# Patient Record
Sex: MALE | Race: WHITE | NOT HISPANIC OR LATINO | Employment: FULL TIME | RURAL
[De-identification: names, ages, dates, MRNs, and addresses within clinical notes are randomized per-mention and may not be internally consistent; named-entity substitution may affect disease eponyms.]

---

## 2021-10-20 ENCOUNTER — HOSPITAL ENCOUNTER (OUTPATIENT)
Dept: RADIOLOGY | Facility: HOSPITAL | Age: 51
Discharge: HOME | End: 2021-10-20
Attending: ORTHOPAEDIC SURGERY
Payer: OTHER GOVERNMENT

## 2021-10-20 DIAGNOSIS — M25.562 ACUTE PAIN OF LEFT KNEE: ICD-10-CM

## 2021-11-01 ENCOUNTER — HOSPITAL ENCOUNTER (OUTPATIENT)
Dept: RADIOLOGY | Facility: HOSPITAL | Age: 51
Discharge: HOME | End: 2021-11-01
Attending: ORTHOPAEDIC SURGERY
Payer: OTHER GOVERNMENT

## 2021-11-01 DIAGNOSIS — M25.562 LEFT KNEE PAIN, UNSPECIFIED CHRONICITY: ICD-10-CM

## 2021-11-01 PROBLEM — M76.52 PATELLAR TENDINITIS OF LEFT KNEE: Status: ACTIVE | Noted: 2021-11-01

## 2021-11-01 PROBLEM — M72.2 PLANTAR FASCIITIS, BILATERAL: Status: ACTIVE | Noted: 2021-11-01

## 2021-11-01 PROCEDURE — 73564 X-RAY EXAM KNEE 4 OR MORE: CPT | Mod: TC,LT

## 2022-07-01 DIAGNOSIS — M54.50 LOW BACK PAIN: ICD-10-CM

## 2022-07-01 DIAGNOSIS — M25.569 KNEE PAIN: Primary | ICD-10-CM

## 2022-07-29 ENCOUNTER — CLINICAL SUPPORT (OUTPATIENT)
Dept: REHABILITATION | Facility: HOSPITAL | Age: 52
End: 2022-07-29
Attending: NURSE PRACTITIONER
Payer: OTHER GOVERNMENT

## 2022-07-29 DIAGNOSIS — R52 PAIN: ICD-10-CM

## 2022-07-29 DIAGNOSIS — M54.50 CHRONIC BILATERAL LOW BACK PAIN WITHOUT SCIATICA: ICD-10-CM

## 2022-07-29 DIAGNOSIS — M25.569 KNEE PAIN: Primary | ICD-10-CM

## 2022-07-29 DIAGNOSIS — G89.29 CHRONIC PAIN OF LEFT KNEE: ICD-10-CM

## 2022-07-29 DIAGNOSIS — M54.50 LOW BACK PAIN: ICD-10-CM

## 2022-07-29 DIAGNOSIS — M25.60 DECREASED RANGE OF MOTION: ICD-10-CM

## 2022-07-29 DIAGNOSIS — R53.1 DECREASED STRENGTH: ICD-10-CM

## 2022-07-29 DIAGNOSIS — G89.29 CHRONIC BILATERAL LOW BACK PAIN WITHOUT SCIATICA: ICD-10-CM

## 2022-07-29 DIAGNOSIS — M25.562 CHRONIC PAIN OF LEFT KNEE: ICD-10-CM

## 2022-07-29 PROCEDURE — 97161 PT EVAL LOW COMPLEX 20 MIN: CPT

## 2022-07-29 PROCEDURE — 97140 MANUAL THERAPY 1/> REGIONS: CPT

## 2022-07-29 NOTE — PLAN OF CARE
[facility]  Physical Therapy Initial Evaluation    Date: 7/29/2022   Name: Devon Chapa  Clinic Number: [id number]    Therapy Diagnosis:   Encounter Diagnoses   Name Primary?    Knee pain Yes    Low back pain     Pain     Decreased strength     Decreased range of motion      Physician: Chaparrita Parra NP    Physician Orders: PT Eval and Treat   Medical Diagnosis from Referral: Low back and bilateral upper back pain  Evaluation Date: 7/29/2022  Authorization Period Expiration: 11/16/22  Plan of Care Expiration: 8/31/22  Visit # / Visits authorized: 1/ 15    Time In: 1218pm  Time Out: 1256pm  Total Appointment Time (timed & untimed codes): 38 minutes    Precautions: Standard and Diabetes    Subjective   Date of onset: ~ 10 years ago    History of current condition - Devon reports: Patient had previous therapy for left patellar tendonitis at [facility] and doing much better. Is not interested in left knee physical therapy at this time. Currently, patient states his back and shoulders are hurting. In the past, the patient had chiropractor, TENS and dry needling and therapy with relief. Have a consult with chiropractor currently. The low back pain and bilateral upper trapezius and midscapular region is constant dull ache and a stinging pain. Worse with lifting, lying flat to sleep, bending, prolonged driving ~ 1 hour.  Relief with Flexeril and heat. Sleeping well. Currently feel stable with legs and balance. Denies numbness/tingling and no sensation deficits. Independent with all activities of daily living. Shingles shot in February causes the right arm to have tingling and numbness in the right hand intermittent. History and currently have bilateral plantar fascitis and scoliosis.   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      Medical History:   No past medical history on file.    Surgical History:   Devon Chapa  has a past  surgical history that includes Splenectomy, total.    Medications:   Devon has a current medication list which includes the following prescription(s): cyclobenzaprine, omeprazole, and sertraline.    Allergies:   Review of patient's allergies indicates:  No Known Allergies     Imaging, bone scan films: left patellar tendonitis     Pain:  Current 6/10, worst 10/10,  Location: bilateral back    Description: Dull, Throbbing, Tingling, Sharp and Shooting  Aggravating Factors: Sitting, Standing, Bending, Walking and Flexing  Easing Factors: pain medication, heating pad and rest      Objective     Level pelvis  Juddering with weak core stabilization during active forward bend  Able to maintain single leg balance with no loss of balance  Bilateral light touch intact for upper and lower extremities   Manual muscle test for bilateral lower extremities 5/5  Manual muscle test right glute max 4/5, Left glute max 3/5  (-) bilateral sitting slump test  Manual muscle test for bilateral upper extremities 5/5  Cervical range of motion within functional limits  Bilateral shoulder range of motion within functional limits   No leg length difference  Trigger point painful along right upper trapezius  (-) bilateral straight leg raise test   (-) bilateral RAMON  Normal pivm with lumbar rotation  Minimal firing of multifidus with left hip extension      Limitation/Restriction for FOTO Survey    Therapist reviewed FOTO scores for Devon Chapa on 7/29/2022.   FOTO documents entered into [employer] - see Media section.    Intake Score: 58%       Dry Needling Assessment:  Patient demonstrates appropriate response to Functional Dry Needling.  Objective:  See EMR under MEDIA for written consent provided 7/29/2022.   Palpation Assessment to determine the necessity for Functional Dry Needling: tender trigger point and muscle guarding.   Devon received the following manual therapy techniques: trigger point location and evaluation  palpation  Plan:   Monitor response to Functional Dry Needling. Continue with Functional Dry Needling in plan of care as appropriate   Devon received the following manual therapy techniques: Myofacial release were applied to the: right trapezius for 8 minutes, includin x 60mm to right trapezius trigger point    Home Exercises and Patient Education Provided    Education provided:   - eval findings and dry needling outcome    Assessment   Devon is a 51 y.o. male referred to outpatient Physical Therapy with a medical diagnosis of low back pain and muscular tightness across bilateral upper trapezius. Patient presents with right trigger point tenderness of the upper trapezius. Patient has normal shoulder range of motion and good scapulothoracic rhythm but may need myofascial release, dry needling and scapular stabilization to ease muscle tension across the shoulders. Patient demonstrates weak core stabilization but normal pelvic mobility and positioning. Patient demonstrates good upper and lower extremities strength and normal cervical range of motion. At this time, patient will benefit from skilled physical therapy to address deficits with core and hip stabilization, stretching, modalities, dry needling, myofascial release and scapular stabilization exercises. Patient received dry needling today and states he felt a deep throbbing during the needle application but did not report a significant change in muscle tension after.     Patient prognosis is Good.   Patient will benefit from skilled outpatient Physical Therapy to address the deficits stated above and in the chart below, provide patient /family education, and to maximize patientt's level of independence.     Plan of care discussed with patient: Yes  Patient's spiritual, cultural and educational needs considered and patient is agreeable to the plan of care and goals as stated below:     Anticipated Barriers for therapy: none     Goals:  Short Term  Goals: 4 weeks   1. Patient will have 4+/5 for bilateral glute max manual muscle test for stability  2. Patient will be able to maintain prolonged positions including driving for 1 hour with 2/10 pain  3. Patient will be able to lie supine at night to sleep without waking from pain    Long Term Goals: 8 weeks   1. Patient will have 5/5 bilateral lower extremities manual muscle test   2. Patient will be able to maintain prolonged positions for as long as desired with 0/10 pain    Plan   Plan of care Certification: 7/29/2022 to 8/31/22.    Outpatient Physical Therapy 2 times weekly for 8 weeks to include the following interventions: Cervical/Lumbar Traction, Electrical Stimulation IFC/Premod, Gait Training, Manual Therapy, Moist Heat/ Ice, Neuromuscular Re-ed, Patient Education, Therapeutic Exercise, Ultrasound and Dex, Dry Needling.     KATEY PALACIOS, PT        I CERTIFY THE NEED FOR THESE SERVICES FURNISHED UNDER THIS PLAN OF TREATMENT AND WHILE UNDER MY CARE.    Physician's comments:      Physician's Signature: ____________________________________________Date________________        Please fax this MD signed form to:  [facility]  [phone] fax

## 2022-08-08 ENCOUNTER — CLINICAL SUPPORT (OUTPATIENT)
Dept: REHABILITATION | Facility: HOSPITAL | Age: 52
End: 2022-08-08
Attending: NURSE PRACTITIONER
Payer: OTHER GOVERNMENT

## 2022-08-08 DIAGNOSIS — R53.1 DECREASED STRENGTH: ICD-10-CM

## 2022-08-08 DIAGNOSIS — M25.60 DECREASED RANGE OF MOTION: ICD-10-CM

## 2022-08-08 DIAGNOSIS — R52 PAIN: Primary | ICD-10-CM

## 2022-08-08 PROCEDURE — 97014 ELECTRIC STIMULATION THERAPY: CPT | Mod: CQ

## 2022-08-08 PROCEDURE — 97112 NEUROMUSCULAR REEDUCATION: CPT | Mod: CQ

## 2022-08-08 PROCEDURE — 97110 THERAPEUTIC EXERCISES: CPT | Mod: CQ

## 2022-08-08 NOTE — PROGRESS NOTES
[facility]   Physical Therapy Treatment Note     Name: Devon Chapa  Clinic Number: [id number]  Physician: Chaparrita Parra NP  Visit Date: 8/8/2022   Therapy Diagnosis:        Encounter Diagnoses   Name Primary?    Knee pain Yes    Low back pain      Pain      Decreased strength      Decreased range of motion        Physician Orders: PT Eval and Treat   Medical Diagnosis from Referral: Low back and bilateral upper back pain  Evaluation Date: 7/29/2022  Authorization Period Expiration: 11/16/22  Plan of Care Expiration: 8/31/22  Visit # / Visits authorized: 2/ 15    PTA Visit #: 1/5     Time In: 04:42 pm  Time Out: 5:30 pm  Total Billable Time: 48 minutes    SUBJECTIVE     Pt reports: he has pain in his upper back today. Dull, thud mostly.   He was compliant with home exercise program.  Response to previous treatment: first visit since evaluation   Functional change: n/a    Pain: 5/10  Location: bilateral back    Precautions: Standard and Diabetes     OBJECTIVE     Objective Measures updated at progress report unless specified.     Treatment     Devon received the treatments listed below:      therapeutic exercises to develop strength, endurance, ROM, flexibility, posture and core stabilization for 20 minutes including:  Back Exercises    Bike 5 minutes    Calf Stretch slant board 4 x 20 second hold    Hamstring Stretch Bilateral at step 4 x 15 second hold    Trunk flexion and rot left-right with swiss ball  5 x ea way with 3 second hold    Cybex Back 3 x 10 4 plates   Cybex Abdominals    Cybex Leg Press -  Bilateral  6 plates 3 x 10   Prone quad stretch with strap                  neuromuscular re-education activities to improve: Coordination, Sense, Proprioception and Posture for 10 minutes. The following activities were included:  Cybex Rows- Close  2 x 10 4 plates   Cybex Rows - Wide 2 x 10 4 plates     Bilateral scapular retractions with extension  Blue handles 20x 2 second  hold    Bilateral horizontal abduction with scapular retractions Red 20x 2 second hold        supervised modalities after being cleared for contradictions: TENS:  Devon received TENS electrical stimulation for pain to the low back lumbar spine. Pt received continuous mode  for 10 minutes. Devon tolerated treatment well without any adverse effects.     hot pack for 10 minutes to lumbar spine in prone with TENS    Patient Education and Home Exercises     Home Exercises Provided and Patient Education Provided     Education provided:   - home exercise program instruction with handout given. Pt demo understanding by verbal agreement.     Written Home Exercises Provided: yes. Exercises were reviewed and Devon was able to demonstrate them prior to the end of the session.  Devon demonstrated good  understanding of the education provided. See EMR under Patient Instructions for exercises provided during therapy sessions    ASSESSMENT     Therapist initiated Plan of Care with focus of scapular, thoracic and LSPINE therex. Pt did well with this and required cues to perform exercises correctly. Pt has stiffness present bilaterally in rhomboid/lat area. Therapist issued handout to home exercise program today with bands and instruction with performance. Ended tx with premod to Lspine and MHP in prone. Pt expressed relief post tx. Will progress as tolerated when pt returns 8/10.     PMH:   Devon is a 51 y.o. male referred to outpatient Physical Therapy with a medical diagnosis of low back pain and muscular tightness across bilateral upper trapezius. Patient presents with right trigger point tenderness of the upper trapezius. Patient has normal shoulder range of motion and good scapulothoracic rhythm but may need myofascial release, dry needling and scapular stabilization to ease muscle tension across the shoulders. Patient demonstrates weak core stabilization but normal pelvic mobility and positioning.  Patient demonstrates good upper and lower extremities strength and normal cervical range of motion. At this time, patient will benefit from skilled physical therapy to address deficits with core and hip stabilization, stretching, modalities, dry needling, myofascial release and scapular stabilization exercises. Patient received dry needling today and states he felt a deep throbbing during the needle application but did not report a significant change in muscle tension after.        Devon Is progressing towards his goals.   Pt prognosis is Good.     Pt will continue to benefit from skilled outpatient physical therapy to address the deficits listed in the problem list box on initial evaluation, provide pt/family education and to maximize pt's level of independence in the home and community environment.     Pt's spiritual, cultural and educational needs considered and pt agreeable to plan of care and goals.     Anticipated Barriers for therapy: none      Goals:  Short Term Goals: 4 weeks   1. Patient will have 4+/5 for bilateral glute max manual muscle test for stability  2. Patient will be able to maintain prolonged positions including driving for 1 hour with 2/10 pain  3. Patient will be able to lie supine at night to sleep without waking from pain     Long Term Goals: 8 weeks   1. Patient will have 5/5 bilateral lower extremities manual muscle test   2. Patient will be able to maintain prolonged positions for as long as desired with 0/10 pain    PLAN     Plan of care Certification: 7/29/2022 to 8/31/22.     Outpatient Physical Therapy 2 times weekly for 8 weeks to include the following interventions: Cervical/Lumbar Traction, Electrical Stimulation IFC/Premod, Gait Training, Manual Therapy, Moist Heat/ Ice, Neuromuscular Re-ed, Patient Education, Therapeutic Exercise, Ultrasound and Dex, Dry Needling.        Misha Victoria, PTA    8/8/2022

## 2022-08-15 ENCOUNTER — CLINICAL SUPPORT (OUTPATIENT)
Dept: REHABILITATION | Facility: HOSPITAL | Age: 52
End: 2022-08-15
Attending: NURSE PRACTITIONER
Payer: OTHER GOVERNMENT

## 2022-08-15 DIAGNOSIS — R53.1 DECREASED STRENGTH: ICD-10-CM

## 2022-08-15 DIAGNOSIS — R52 PAIN: Primary | ICD-10-CM

## 2022-08-15 DIAGNOSIS — M25.60 DECREASED RANGE OF MOTION: ICD-10-CM

## 2022-08-15 PROCEDURE — 97010 HOT OR COLD PACKS THERAPY: CPT | Mod: CQ

## 2022-08-15 PROCEDURE — 97112 NEUROMUSCULAR REEDUCATION: CPT | Mod: CQ

## 2022-08-15 PROCEDURE — 97014 ELECTRIC STIMULATION THERAPY: CPT | Mod: CQ

## 2022-08-15 PROCEDURE — 97110 THERAPEUTIC EXERCISES: CPT | Mod: CQ

## 2022-08-15 NOTE — PROGRESS NOTES
[facility]   Physical Therapy Treatment Note     Name: Devon Chapa  Clinic Number: [id number]  Physician: Chaparrita Parra NP  Visit Date: 8/15/2022   Therapy Diagnosis:        Encounter Diagnoses   Name Primary?    Knee pain Yes    Low back pain      Pain      Decreased strength      Decreased range of motion        Physician Orders: PT Eval and Treat   Medical Diagnosis from Referral: Low back and bilateral upper back pain  Evaluation Date: 7/29/2022  Authorization Period Expiration: 11/16/22  Plan of Care Expiration: 8/31/22  Visit # / Visits authorized: 3/ 15    PTA Visit #: 2/5     Time In: 01:45 pm  Time Out: 02:30 pm  Total Billable Time: 48 minutes    SUBJECTIVE     Pt reports: Pain is a 5/10.  He was compliant with home exercise program.  Response to previous treatment: first visit since evaluation   Functional change: n/a    Pain: 5/10  Location: bilateral back    Precautions: Standard and Diabetes     OBJECTIVE     Objective Measures updated at progress report unless specified.     Treatment     Devon received the treatments listed below:      therapeutic exercises to develop strength, endurance, ROM, flexibility, posture and core stabilization for 20 minutes including:  Back Exercises    Bike 5 minutes    Calf Stretch slant board 4 x 20 second hold    Hamstring Stretch Bilateral at step 4 x 15 second hold    Trunk flexion and rot left-right with swiss ball  5 x ea way with 3 second hold    Cybex Back 3 x 10 4 plates   Cybex Abdominals    Cybex Leg Press -  Bilateral  6 plates 3 x 10   Prone quad stretch with strap                  neuromuscular re-education activities to improve: Coordination, Sense, Proprioception and Posture for 10 minutes. The following activities were included:  Cybex Rows- Close 2 x 10 4 plates   Cybex Rows - Wide 2 x 10 4 plates     Bilateral scapular retractions with extension  Blue handles 20x 2 second hold    Bilateral horizontal abduction  with scapular retractions Red 20x 2 second hold        supervised modalities after being cleared for contradictions: TENS:  Devon received TENS electrical stimulation for pain to the low back lumbar spine. Pt received continuous mode  for 10 minutes. Devon tolerated treatment well without any adverse effects.     hot pack for 10 minutes to lumbar spine in prone with TENS    Patient Education and Home Exercises     Home Exercises Provided and Patient Education Provided     Education provided:   - home exercise program instruction with handout given. Pt demo understanding by verbal agreement.     Written Home Exercises Provided: yes. Exercises were reviewed and Devon was able to demonstrate them prior to the end of the session.  Devon demonstrated good  understanding of the education provided. See EMR under Patient Instructions for exercises provided during therapy sessions    ASSESSMENT     Pt has stiffness present bilaterally in rhomboid/lat area. Therapist issued handout to home exercise program today with bands and instruction with performance. Ended tx with premod to Lspine and MHP in prone. Patient did not have any difficulty performing therapeutic exercises.  Pt expressed relief post tx. Will progress as tolerated when pt returns 8/10.     PMH:   Devon is a 51 y.o. male referred to outpatient Physical Therapy with a medical diagnosis of low back pain and muscular tightness across bilateral upper trapezius. Patient presents with right trigger point tenderness of the upper trapezius. Patient has normal shoulder range of motion and good scapulothoracic rhythm but may need myofascial release, dry needling and scapular stabilization to ease muscle tension across the shoulders. Patient demonstrates weak core stabilization but normal pelvic mobility and positioning. Patient demonstrates good upper and lower extremities strength and normal cervical range of motion. At this time, patient  will benefit from skilled physical therapy to address deficits with core and hip stabilization, stretching, modalities, dry needling, myofascial release and scapular stabilization exercises. Patient received dry needling today and states he felt a deep throbbing during the needle application but did not report a significant change in muscle tension after.        Devon Is progressing towards his goals.   Pt prognosis is Good.     Pt will continue to benefit from skilled outpatient physical therapy to address the deficits listed in the problem list box on initial evaluation, provide pt/family education and to maximize pt's level of independence in the home and community environment.     Pt's spiritual, cultural and educational needs considered and pt agreeable to plan of care and goals.     Anticipated Barriers for therapy: none      Goals:  Short Term Goals: 4 weeks   1. Patient will have 4+/5 for bilateral glute max manual muscle test for stability  2. Patient will be able to maintain prolonged positions including driving for 1 hour with 2/10 pain  3. Patient will be able to lie supine at night to sleep without waking from pain     Long Term Goals: 8 weeks   1. Patient will have 5/5 bilateral lower extremities manual muscle test   2. Patient will be able to maintain prolonged positions for as long as desired with 0/10 pain    PLAN     Plan of care Certification: 7/29/2022 to 8/31/22.     Outpatient Physical Therapy 2 times weekly for 8 weeks to include the following interventions: Cervical/Lumbar Traction, Electrical Stimulation IFC/Premod, Gait Training, Manual Therapy, Moist Heat/ Ice, Neuromuscular Re-ed, Patient Education, Therapeutic Exercise, Ultrasound and Dex, Dry Needling.        Shonda Redd, PTA    8/15/2022

## 2022-08-17 ENCOUNTER — CLINICAL SUPPORT (OUTPATIENT)
Dept: REHABILITATION | Facility: HOSPITAL | Age: 52
End: 2022-08-17
Attending: NURSE PRACTITIONER
Payer: OTHER GOVERNMENT

## 2022-08-17 DIAGNOSIS — R52 PAIN: Primary | ICD-10-CM

## 2022-08-17 DIAGNOSIS — M25.60 DECREASED RANGE OF MOTION: ICD-10-CM

## 2022-08-17 DIAGNOSIS — R53.1 DECREASED STRENGTH: ICD-10-CM

## 2022-08-17 PROCEDURE — 97010 HOT OR COLD PACKS THERAPY: CPT | Mod: CQ

## 2022-08-17 PROCEDURE — 97014 ELECTRIC STIMULATION THERAPY: CPT | Mod: CQ

## 2022-08-17 PROCEDURE — 97112 NEUROMUSCULAR REEDUCATION: CPT | Mod: CQ

## 2022-08-17 PROCEDURE — 97110 THERAPEUTIC EXERCISES: CPT | Mod: CQ

## 2022-08-17 NOTE — PROGRESS NOTES
[facility]   Physical Therapy Treatment Note     Name: Devon Chapa  Clinic Number: [id number]  Physician: Chaparrita Parra NP  Visit Date: 8/17/2022   Therapy Diagnosis:        Encounter Diagnoses   Name Primary?    Knee pain Yes    Low back pain      Pain      Decreased strength      Decreased range of motion        Physician Orders: PT Eval and Treat   Medical Diagnosis from Referral: Low back and bilateral upper back pain  Evaluation Date: 7/29/2022  Authorization Period Expiration: 11/16/22  Plan of Care Expiration: 8/31/22  Visit # / Visits authorized: 4/ 15    PTA Visit #: 3/5     Time In: 3:10 pm  Time Out: 3:55 pm  Total Billable Time: 45 minutes    SUBJECTIVE     Pt reports: just a nagging pain  He was compliant with home exercise program.  Response to previous treatment: I was a little sore but was able to be mobile  Functional change: n/a    Pain: 3/10  Location: bilateral back    Precautions: Standard and Diabetes     OBJECTIVE     Objective Measures updated at progress report unless specified.     Treatment     Devon received the treatments listed below:      therapeutic exercises to develop strength, endurance, ROM, flexibility, posture and core stabilization for 20 minutes including:  Back Exercises    Bike 5 minutes    Calf Stretch slant board 4 x 20 second hold    Hamstring Stretch Bilateral at step 4 x 15 second hold    Trunk flexion and rot left-right with swiss ball  5 x ea way with 3 second hold    Cybex Back 3 x 10 4 plates   Cybex Abdominals    Cybex Leg Press -  Bilateral  6 plates 3 x 10   Prone quad stretch with strap    Truck rotation  2 x 10 each way              neuromuscular re-education activities to improve: Coordination, Sense, Proprioception and Posture for 10 minutes. The following activities were included:  Cybex Rows- Close 3 x 10 4 plates   Cybex Rows - Wide 3 x 10 4 plates     Bilateral scapular retractions with extension  Green TB20x 2  second hold    Bilateral horizontal abduction with scapular retractions Green TB 20x 2 second hold        supervised modalities after being cleared for contradictions: TENS:  Devon received TENS electrical stimulation for pain to the low back lumbar spine. Pt received continuous mode  for 15 minutes. Devon tolerated treatment well without any adverse effects.     hot pack for 15 minutes to lumbar spine in prone with TENS    Patient Education and Home Exercises     Home Exercises Provided and Patient Education Provided     Education provided:   - home exercise program instruction with handout given. Pt demo understanding by verbal agreement.     Written Home Exercises Provided: yes. Exercises were reviewed and Devon was able to demonstrate them prior to the end of the session.  Devon demonstrated good  understanding of the education provided. See EMR under Patient Instructions for exercises provided during therapy sessions    ASSESSMENT     Pt has stiffness present bilaterally in rhomboid/lat area. Added truck rotation at cybex tower. Ended tx with premod to Lspine and MHP in prone. Patient did not have any difficulty performing therapeutic exercises.  Pt expressed relief post tx. Will progress as tolerated when pt returns 8/10.     PMH:   Devon is a 51 y.o. male referred to outpatient Physical Therapy with a medical diagnosis of low back pain and muscular tightness across bilateral upper trapezius. Patient presents with right trigger point tenderness of the upper trapezius. Patient has normal shoulder range of motion and good scapulothoracic rhythm but may need myofascial release, dry needling and scapular stabilization to ease muscle tension across the shoulders. Patient demonstrates weak core stabilization but normal pelvic mobility and positioning. Patient demonstrates good upper and lower extremities strength and normal cervical range of motion. At this time, patient will benefit  from skilled physical therapy to address deficits with core and hip stabilization, stretching, modalities, dry needling, myofascial release and scapular stabilization exercises. Patient received dry needling today and states he felt a deep throbbing during the needle application but did not report a significant change in muscle tension after.        Devon Is progressing towards his goals.   Pt prognosis is Good.     Pt will continue to benefit from skilled outpatient physical therapy to address the deficits listed in the problem list box on initial evaluation, provide pt/family education and to maximize pt's level of independence in the home and community environment.     Pt's spiritual, cultural and educational needs considered and pt agreeable to plan of care and goals.     Anticipated Barriers for therapy: none      Goals:  Short Term Goals: 4 weeks   1. Patient will have 4+/5 for bilateral glute max manual muscle test for stability  2. Patient will be able to maintain prolonged positions including driving for 1 hour with 2/10 pain  3. Patient will be able to lie supine at night to sleep without waking from pain     Long Term Goals: 8 weeks   1. Patient will have 5/5 bilateral lower extremities manual muscle test   2. Patient will be able to maintain prolonged positions for as long as desired with 0/10 pain    PLAN     Plan of care Certification: 7/29/2022 to 8/31/22.     Outpatient Physical Therapy 2 times weekly for 8 weeks to include the following interventions: Cervical/Lumbar Traction, Electrical Stimulation IFC/Premod, Gait Training, Manual Therapy, Moist Heat/ Ice, Neuromuscular Re-ed, Patient Education, Therapeutic Exercise, Ultrasound and Dex, Dry Needling.        Shonda Redd, PTA    8/17/2022

## 2022-08-22 ENCOUNTER — CLINICAL SUPPORT (OUTPATIENT)
Dept: REHABILITATION | Facility: HOSPITAL | Age: 52
End: 2022-08-22
Payer: OTHER GOVERNMENT

## 2022-08-22 DIAGNOSIS — M25.60 DECREASED RANGE OF MOTION: ICD-10-CM

## 2022-08-22 DIAGNOSIS — R53.1 DECREASED STRENGTH: ICD-10-CM

## 2022-08-22 DIAGNOSIS — R52 PAIN: Primary | ICD-10-CM

## 2022-08-22 PROCEDURE — 97112 NEUROMUSCULAR REEDUCATION: CPT

## 2022-08-22 PROCEDURE — 97014 ELECTRIC STIMULATION THERAPY: CPT

## 2022-08-22 NOTE — PROGRESS NOTES
[facility]   Physical Therapy Treatment Note     Name: Devon Chapa  Clinic Number: [id number]  Physician: Chaparrita Parra NP  Visit Date: 8/22/2022   Therapy Diagnosis:        Encounter Diagnoses   Name Primary?    Knee pain Yes    Low back pain      Pain      Decreased strength      Decreased range of motion        Physician Orders: PT Eval and Treat   Medical Diagnosis from Referral: Low back and bilateral upper back pain  Evaluation Date: 7/29/2022  Authorization Period Expiration: 11/16/22  Plan of Care Expiration: 8/31/22  Visit # / Visits authorized: 5/ 15    Time In: 240 pm  Time Out: 328 pm  Total Billable Time: 48 minutes    SUBJECTIVE     Pt reports: Today the mid back/shoulder blade area is what is hurting the most. Everything else is going ok. Have a chiropractor appointment tomorrow.     He was compliant with home exercise program.  Response to previous treatment: I was a little sore but was able to be mobile  Functional change: n/a    Pain: 6/10  Location: bilateral back    Precautions: Standard and Diabetes     OBJECTIVE     Objective Measures updated at progress report unless specified.     Treatment     Devon received the treatments listed below:      therapeutic exercises to develop strength, endurance, ROM, flexibility, posture and core stabilization for 35 minutes including:  Back Exercises    UBE x 4 min    Neuromuscular Re-education  Bilateral shoulder extension blue x 30  Standing external rotation blue x 30  Standing internal rotation blue x 30  Standing horizontal 90 abduction blue x 30  Cybex rows 5pl x 30 at each handle   Lat pull down 3pl x 30  Sitting cable flexion/extension 4pl x 30    IFC 4pole with MHP x 15 minutes to upper/mid back in prone        (not today)  Bike 5 minutes    Calf Stretch slant board 4 x 20 second hold    Hamstring Stretch Bilateral at step 4 x 15 second hold    Trunk flexion and rot left-right with swiss ball  5 x ea way  with 3 second hold    Cybex Back 3 x 10 4 plates   Cybex Abdominals    Cybex Leg Press -  Bilateral  6 plates 3 x 10   Prone quad stretch with strap    Truck rotation  2 x 10 each way            (not today)  neuromuscular re-education activities to improve: Coordination, Sense, Proprioception and Posture for 10 minutes. The following activities were included:  Cybex Rows- Close 3 x 10 4 plates   Cybex Rows - Wide 3 x 10 4 plates     Bilateral scapular retractions with extension  Green TB20x 2 second hold    Bilateral horizontal abduction with scapular retractions Green TB 20x 2 second hold      (not today)  supervised modalities after being cleared for contradictions: TENS:  Devon received TENS electrical stimulation for pain to the low back lumbar spine. Pt received continuous mode  for 15 minutes. Devon tolerated treatment well without any adverse effects.       Patient Education and Home Exercises     Home Exercises Provided and Patient Education Provided     Education provided:   - home exercise program instruction with handout given. Pt demo understanding by verbal agreement.     Written Home Exercises Provided: yes. Exercises were reviewed and Devon was able to demonstrate them prior to the end of the session.  Devon demonstrated good  understanding of the education provided. See EMR under Patient Instructions for exercises provided during therapy sessions    ASSESSMENT     Focused on scapular stabilization and posterior shoulder exercises. Verbalized scapular retraction during all exercises to promote neuromuscular education for scapular stabilization. Patient fatigued and did well. Finished with modalities for pain control.     PMH:   Devon is a 51 y.o. male referred to outpatient Physical Therapy with a medical diagnosis of low back pain and muscular tightness across bilateral upper trapezius. Patient presents with right trigger point tenderness of the upper trapezius. Patient  has normal shoulder range of motion and good scapulothoracic rhythm but may need myofascial release, dry needling and scapular stabilization to ease muscle tension across the shoulders. Patient demonstrates weak core stabilization but normal pelvic mobility and positioning. Patient demonstrates good upper and lower extremities strength and normal cervical range of motion. At this time, patient will benefit from skilled physical therapy to address deficits with core and hip stabilization, stretching, modalities, dry needling, myofascial release and scapular stabilization exercises. Patient received dry needling today and states he felt a deep throbbing during the needle application but did not report a significant change in muscle tension after.        Devon Is progressing towards his goals.   Pt prognosis is Good.     Pt will continue to benefit from skilled outpatient physical therapy to address the deficits listed in the problem list box on initial evaluation, provide pt/family education and to maximize pt's level of independence in the home and community environment.     Pt's spiritual, cultural and educational needs considered and pt agreeable to plan of care and goals.     Anticipated Barriers for therapy: none      Goals:  Short Term Goals: 4 weeks   1. Patient will have 4+/5 for bilateral glute max manual muscle test for stability  2. Patient will be able to maintain prolonged positions including driving for 1 hour with 2/10 pain  3. Patient will be able to lie supine at night to sleep without waking from pain     Long Term Goals: 8 weeks   1. Patient will have 5/5 bilateral lower extremities manual muscle test   2. Patient will be able to maintain prolonged positions for as long as desired with 0/10 pain    PLAN     Plan of care Certification: 7/29/2022 to 8/31/22.     Outpatient Physical Therapy 2 times weekly for 8 weeks to include the following interventions: Cervical/Lumbar Traction, Electrical  Stimulation IFC/Premod, Gait Training, Manual Therapy, Moist Heat/ Ice, Neuromuscular Re-ed, Patient Education, Therapeutic Exercise, Ultrasound and Dex, Dry Needling.        KATEY PALACIOS, PT    8/22/2022

## 2022-08-24 ENCOUNTER — CLINICAL SUPPORT (OUTPATIENT)
Dept: REHABILITATION | Facility: HOSPITAL | Age: 52
End: 2022-08-24
Payer: OTHER GOVERNMENT

## 2022-08-24 DIAGNOSIS — R53.1 DECREASED STRENGTH: ICD-10-CM

## 2022-08-24 DIAGNOSIS — R52 PAIN: Primary | ICD-10-CM

## 2022-08-24 DIAGNOSIS — M25.60 DECREASED RANGE OF MOTION: ICD-10-CM

## 2022-08-24 PROCEDURE — 97110 THERAPEUTIC EXERCISES: CPT | Mod: CQ

## 2022-08-24 PROCEDURE — 97010 HOT OR COLD PACKS THERAPY: CPT | Mod: CQ

## 2022-08-24 PROCEDURE — 97014 ELECTRIC STIMULATION THERAPY: CPT | Mod: CQ

## 2022-08-24 PROCEDURE — 97112 NEUROMUSCULAR REEDUCATION: CPT | Mod: CQ

## 2022-08-24 NOTE — PROGRESS NOTES
[facility]   Physical Therapy Treatment Note     Name: Devon Cahpa  Clinic Number: [id number]  Physician: Chaparrita Parra NP  Visit Date: 8/24/2022   Therapy Diagnosis:        Encounter Diagnoses   Name Primary?    Knee pain Yes    Low back pain      Pain      Decreased strength      Decreased range of motion        Physician Orders: PT Eval and Treat   Medical Diagnosis from Referral: Low back and bilateral upper back pain  Evaluation Date: 7/29/2022  Authorization Period Expiration: 11/16/22  Plan of Care Expiration: 8/31/22  Visit # / Visits authorized: 6/ 15    Time In: 4:08 pm  Time Out: 5:00 pm  Total Billable Time: 55 minutes    SUBJECTIVE     Pt reports: Everything is about the same.    He was compliant with home exercise program.  Response to previous treatment: It was good  Functional change: n/a    Pain: 4/10  Location: bilateral back    Precautions: Standard and Diabetes     OBJECTIVE     Objective Measures updated at progress report unless specified.     Treatment     Devon received the treatments listed below:      therapeutic exercises to develop strength, endurance, ROM, flexibility, posture and core stabilization for 25 minutes including:  Bike x 5 min  Bilateral shoulder extension blue x 30  Standing external rotation blue x 30  Standing internal rotation blue x 30  Lat pull down 3pl x 30  Sitting cable flexion/extension 4pl x 30      neuromuscular re-education activities to improve: Coordination, Sense, Proprioception and Posture for 13 minutes. The following activities were included:  Cybex Rows- Close 3 x 10 5 plates   Cybex Rows - Wide 3 x 10 5 plates      Bilateral scapular retractions with extension  Blue TB 20x 2 second hold    Bilateral horizontal abduction with scapular retractions Blue TB 20x 2 second hold        supervised modalities after being cleared for contradictions: TENS:  Devon received TENS electrical stimulation for pain to the low  back lumbar spine. Pt received continuous mode  for 15 minutes. Devon tolerated treatment well without any adverse effects.       Patient Education and Home Exercises     Home Exercises Provided and Patient Education Provided     Education provided:   - home exercise program instruction with handout given. Pt demo understanding by verbal agreement.     Written Home Exercises Provided: yes. Exercises were reviewed and Devon was able to demonstrate them prior to the end of the session.  Devon demonstrated good  understanding of the education provided. See EMR under Patient Instructions for exercises provided during therapy sessions    ASSESSMENT     Patient states that he went to the chiropractor today and that he was adjusted in his upper back and pelvis. Therapist focused on scapular stabilization and posterior shoulder exercises. Verbalized scapular retraction during all exercises to promote neuromuscular education for scapular stabilization. Patient fatigued but did well. Finished with modalities for pain control x 15 minutes.    PMH:   Devon is a 51 y.o. male referred to outpatient Physical Therapy with a medical diagnosis of low back pain and muscular tightness across bilateral upper trapezius. Patient presents with right trigger point tenderness of the upper trapezius. Patient has normal shoulder range of motion and good scapulothoracic rhythm but may need myofascial release, dry needling and scapular stabilization to ease muscle tension across the shoulders. Patient demonstrates weak core stabilization but normal pelvic mobility and positioning. Patient demonstrates good upper and lower extremities strength and normal cervical range of motion. At this time, patient will benefit from skilled physical therapy to address deficits with core and hip stabilization, stretching, modalities, dry needling, myofascial release and scapular stabilization exercises. Patient received dry needling  today and states he felt a deep throbbing during the needle application but did not report a significant change in muscle tension after.        Devon Is progressing towards his goals.   Pt prognosis is Good.     Pt will continue to benefit from skilled outpatient physical therapy to address the deficits listed in the problem list box on initial evaluation, provide pt/family education and to maximize pt's level of independence in the home and community environment.     Pt's spiritual, cultural and educational needs considered and pt agreeable to plan of care and goals.     Anticipated Barriers for therapy: none      Goals:  Short Term Goals: 4 weeks   1. Patient will have 4+/5 for bilateral glute max manual muscle test for stability  2. Patient will be able to maintain prolonged positions including driving for 1 hour with 2/10 pain  3. Patient will be able to lie supine at night to sleep without waking from pain     Long Term Goals: 8 weeks   1. Patient will have 5/5 bilateral lower extremities manual muscle test   2. Patient will be able to maintain prolonged positions for as long as desired with 0/10 pain    PLAN     Plan of care Certification: 7/29/2022 to 8/31/22.     Outpatient Physical Therapy 2 times weekly for 8 weeks to include the following interventions: Cervical/Lumbar Traction, Electrical Stimulation IFC/Premod, Gait Training, Manual Therapy, Moist Heat/ Ice, Neuromuscular Re-ed, Patient Education, Therapeutic Exercise, Ultrasound and Dex, Dry Needling.        Shonda Redd, PTA    8/24/2022

## 2022-08-29 ENCOUNTER — CLINICAL SUPPORT (OUTPATIENT)
Dept: REHABILITATION | Facility: HOSPITAL | Age: 52
End: 2022-08-29
Attending: NURSE PRACTITIONER
Payer: OTHER GOVERNMENT

## 2022-08-29 DIAGNOSIS — M25.60 DECREASED RANGE OF MOTION: ICD-10-CM

## 2022-08-29 DIAGNOSIS — R52 PAIN: Primary | ICD-10-CM

## 2022-08-29 DIAGNOSIS — R53.1 DECREASED STRENGTH: ICD-10-CM

## 2022-08-29 PROCEDURE — 97014 ELECTRIC STIMULATION THERAPY: CPT

## 2022-08-29 PROCEDURE — 97112 NEUROMUSCULAR REEDUCATION: CPT

## 2022-08-29 PROCEDURE — 97110 THERAPEUTIC EXERCISES: CPT

## 2022-08-29 NOTE — PROGRESS NOTES
[facility]   Physical Therapy Updated Plan of Care     Name: Devon Chapa  Clinic Number: [id number]  Physician: Chaparrita Parra NP  Visit Date: 8/29/2022   Therapy Diagnosis:        Encounter Diagnoses   Name Primary?    Knee pain Yes    Low back pain      Pain      Decreased strength      Decreased range of motion        Physician Orders: PT Eval and Treat   Medical Diagnosis from Referral: Low back and bilateral upper back pain  Evaluation Date: 7/29/2022  Authorization Period Expiration: 11/16/22  Plan of Care Expiration: 9/28/22  Visit # / Visits authorized: 7/ 15    Time In: 320 pm  Time Out: 412pm  Total Billable Time:  42 minutes    SUBJECTIVE     Pt reports: Back is sore and aggravating. Will get an adjustment every Thursday from chiropractor and he said old trauma to the coccyx, mild disc bulge, last two ribs are underdeveloped and the bone between the hips is not angled correctly. This will be confirmed by an MRI. Able to use a push mower now allowing for more walking and trying to do more stairs at work.     He was compliant with home exercise program.  Response to previous treatment: It was good  Functional change: n/a    Pain: 5/10  Location: bilateral back    Precautions: Standard and Diabetes     OBJECTIVE     Objective Measures updated at progress report unless specified.     Treatment     Devon received the treatments listed below:      therapeutic exercises to develop strength, endurance, ROM, flexibility, posture and core stabilization for 25 minutes including:  Bike x 5 min  Calf stretch 3 x 30 sec  Standing external rotation blue x 30  Standing internal rotation blue x 30  Lat pull down 5pl x 30  Sitting cable flexion/extension 4pl x 30  Sitting shoulder press 5pl x 30  Cybex shoulder press at back handles 5pl x 10      neuromuscular re-education activities to improve: Coordination, Sense, Proprioception and Posture for 13 minutes. The following activities were  included:  Cybex Rows- Close 3 x 10 6 plates   Cybex Rows - Wide 3 x 10 6 plates      Bilateral scapular retractions with extension  Blue TB 30x 2 second hold    Bilateral horizontal abduction with scapular retractions Blue TB 30x 2 second hold        supervised modalities after being cleared for contradictions: TENS:  Devon received TENS electrical stimulation for pain to the low back lumbar spine. Pt received continuous mode  for 15 minutes with MHP in prone. Devon tolerated treatment well without any adverse effects.       Patient Education and Home Exercises     Home Exercises Provided and Patient Education Provided     Education provided:   - home exercise program instruction with handout given. Pt demo understanding by verbal agreement.     Written Home Exercises Provided: yes. Exercises were reviewed and Devon was able to demonstrate them prior to the end of the session.  Devon demonstrated good  understanding of the education provided. See EMR under Patient Instructions for exercises provided during therapy sessions    ASSESSMENT     Therapist focused on scapular stabilization and posterior shoulder exercises. Verbalized scapular retraction during all exercises to promote neuromuscular education for scapular stabilization. Patient fatigued but did well. Finished with modalities for pain control x 15 minutes. Patient is improving with overall strength and pain but continues to have pain during general activity. Patient will continue to benefit from skilled physical therapy to progress toward goals     PMH:   Devon is a 51 y.o. male referred to outpatient Physical Therapy with a medical diagnosis of low back pain and muscular tightness across bilateral upper trapezius. Patient presents with right trigger point tenderness of the upper trapezius. Patient has normal shoulder range of motion and good scapulothoracic rhythm but may need myofascial release, dry needling and scapular  stabilization to ease muscle tension across the shoulders. Patient demonstrates weak core stabilization but normal pelvic mobility and positioning. Patient demonstrates good upper and lower extremities strength and normal cervical range of motion. At this time, patient will benefit from skilled physical therapy to address deficits with core and hip stabilization, stretching, modalities, dry needling, myofascial release and scapular stabilization exercises. Patient received dry needling today and states he felt a deep throbbing during the needle application but did not report a significant change in muscle tension after.        Devon Is progressing towards his goals.   Pt prognosis is Good.     Pt will continue to benefit from skilled outpatient physical therapy to address the deficits listed in the problem list box on initial evaluation, provide pt/family education and to maximize pt's level of independence in the home and community environment.     Pt's spiritual, cultural and educational needs considered and pt agreeable to plan of care and goals.     Anticipated Barriers for therapy: none      All Goals Not Met    Goals:  Short Term Goals: 4 weeks   1. Patient will have 4+/5 for bilateral glute max manual muscle test for stability  2. Patient will be able to maintain prolonged positions including driving for 1 hour with 2/10 pain  3. Patient will be able to lie supine at night to sleep without waking from pain     Long Term Goals: 8 weeks   1. Patient will have 5/5 bilateral lower extremities manual muscle test   2. Patient will be able to maintain prolonged positions for as long as desired with 0/10 pain    Reasons for Recertification of Therapy: to continue to progress toward goals     Plan     Updated Certification Period: 8/29/22 to 9/28/22  Recommended Treatment Plan: 2 times per week for 8 weeks: Cervical/Lumbar Traction, Electrical Stimulation IFC/Premod, Iontophoresis (with Dex), Manual Therapy,  Moist Heat/ Ice, Neuromuscular Re-ed, Patient Education, Therapeutic Exercise, Ultrasound, and Dry Needling  Other Recommendations: none    KATEY PALACIOS, PT  8/30/2022      I CERTIFY THE NEED FOR THESE SERVICES FURNISHED UNDER THIS PLAN OF TREATMENT AND WHILE UNDER MY CARE.    Physician's comments:      Physician's Signature: ___________________________________________________

## 2022-08-31 ENCOUNTER — CLINICAL SUPPORT (OUTPATIENT)
Dept: REHABILITATION | Facility: HOSPITAL | Age: 52
End: 2022-08-31
Attending: NURSE PRACTITIONER
Payer: OTHER GOVERNMENT

## 2022-08-31 DIAGNOSIS — R52 PAIN: Primary | ICD-10-CM

## 2022-08-31 DIAGNOSIS — M25.60 DECREASED RANGE OF MOTION: ICD-10-CM

## 2022-08-31 DIAGNOSIS — R53.1 DECREASED STRENGTH: ICD-10-CM

## 2022-08-31 PROCEDURE — 97010 HOT OR COLD PACKS THERAPY: CPT | Mod: CQ

## 2022-08-31 PROCEDURE — 97110 THERAPEUTIC EXERCISES: CPT | Mod: CQ

## 2022-08-31 PROCEDURE — 97014 ELECTRIC STIMULATION THERAPY: CPT | Mod: CQ

## 2022-08-31 PROCEDURE — 97112 NEUROMUSCULAR REEDUCATION: CPT | Mod: CQ

## 2022-08-31 NOTE — PROGRESS NOTES
[facility]   Physical Therapy Treatment Note     Name: Devon Chapa  Clinic Number: [id number]  Physician: Chaparrita Parra NP  Visit Date: 8/31/2022   Therapy Diagnosis:        Encounter Diagnoses   Name Primary?    Knee pain Yes    Low back pain      Pain      Decreased strength      Decreased range of motion        Physician Orders: PT Eval and Treat   Medical Diagnosis from Referral: Low back and bilateral upper back pain  Evaluation Date: 7/29/2022  Authorization Period Expiration: 11/16/22  Plan of Care Expiration: 8/31/22  Visit # / Visits authorized: 7/ 15    Time In: 3:15 pm  Time Out: 4:10 pm  Total Billable Time: 55 minutes    SUBJECTIVE     Pt reports: Everything is about the same.    He was compliant with home exercise program.  Response to previous treatment: It was good  Functional change: n/a    Pain: 4/10  Location: bilateral back    Precautions: Standard and Diabetes     OBJECTIVE     Objective Measures updated at progress report unless specified.     Treatment     Devon received the treatments listed below:      therapeutic exercises to develop strength, endurance, ROM, flexibility, posture and core stabilization for 25 minutes including:    Bike x 5 min  Slant board 4 x 20 seconds  Hamstring stretch 4 x 20 seconds  Cybex back extension 3 x 10 4#  Bilateral shoulder extension blue x 30  Standing external rotation blue x 30  Standing internal rotation blue x 30  Lat pull down 3pl x 30  Sitting cable flexion/extension 4pl x 30      neuromuscular re-education activities to improve: Coordination, Sense, Proprioception and Posture for 15 minutes. The following activities were included:  Cybex Rows- Close 3 x 10 5 plates   Cybex Rows - Wide 3 x 10 5 plates      Bilateral scapular retractions with extension  Blue TB 20x 2 second hold    Bilateral horizontal abduction with scapular retractions Blue TB 20x 2 second hold        supervised modalities after being cleared for  contradictions: TENS:  Devon received TENS electrical stimulation for pain to the low back lumbar spine. Pt received continuous mode  for 15 minutes. Devon tolerated treatment well without any adverse effects.       Patient Education and Home Exercises     Home Exercises Provided and Patient Education Provided     Education provided:   - home exercise program instruction with handout given. Pt demo understanding by verbal agreement.     Written Home Exercises Provided: yes. Exercises were reviewed and Devon was able to demonstrate them prior to the end of the session.  Devon demonstrated good  understanding of the education provided. See EMR under Patient Instructions for exercises provided during therapy sessions    ASSESSMENT     Therapist focused on scapular stabilization and posterior shoulder exercises with some added lower back exercises. Verbalized scapular retraction during all exercises to promote neuromuscular education for scapular stabilization. Finished with modalities for pain control x 15 minutes.    PMH:   Devon is a 51 y.o. male referred to outpatient Physical Therapy with a medical diagnosis of low back pain and muscular tightness across bilateral upper trapezius. Patient presents with right trigger point tenderness of the upper trapezius. Patient has normal shoulder range of motion and good scapulothoracic rhythm but may need myofascial release, dry needling and scapular stabilization to ease muscle tension across the shoulders. Patient demonstrates weak core stabilization but normal pelvic mobility and positioning. Patient demonstrates good upper and lower extremities strength and normal cervical range of motion. At this time, patient will benefit from skilled physical therapy to address deficits with core and hip stabilization, stretching, modalities, dry needling, myofascial release and scapular stabilization exercises. Patient received dry needling today and  states he felt a deep throbbing during the needle application but did not report a significant change in muscle tension after.        Devon Is progressing towards his goals.   Pt prognosis is Good.     Pt will continue to benefit from skilled outpatient physical therapy to address the deficits listed in the problem list box on initial evaluation, provide pt/family education and to maximize pt's level of independence in the home and community environment.     Pt's spiritual, cultural and educational needs considered and pt agreeable to plan of care and goals.     Anticipated Barriers for therapy: none      Goals:  Short Term Goals: 4 weeks   1. Patient will have 4+/5 for bilateral glute max manual muscle test for stability  2. Patient will be able to maintain prolonged positions including driving for 1 hour with 2/10 pain  3. Patient will be able to lie supine at night to sleep without waking from pain     Long Term Goals: 8 weeks   1. Patient will have 5/5 bilateral lower extremities manual muscle test   2. Patient will be able to maintain prolonged positions for as long as desired with 0/10 pain    PLAN     Plan of care Certification: 7/29/2022 to 8/31/22.     Outpatient Physical Therapy 2 times weekly for 8 weeks to include the following interventions: Cervical/Lumbar Traction, Electrical Stimulation IFC/Premod, Gait Training, Manual Therapy, Moist Heat/ Ice, Neuromuscular Re-ed, Patient Education, Therapeutic Exercise, Ultrasound and Dex, Dry Needling.        Shonda Redd, PTA    8/31/2022

## 2022-09-06 ENCOUNTER — CLINICAL SUPPORT (OUTPATIENT)
Dept: REHABILITATION | Facility: HOSPITAL | Age: 52
End: 2022-09-06
Attending: NURSE PRACTITIONER
Payer: OTHER GOVERNMENT

## 2022-09-06 DIAGNOSIS — M25.60 DECREASED RANGE OF MOTION: ICD-10-CM

## 2022-09-06 DIAGNOSIS — R53.1 DECREASED STRENGTH: ICD-10-CM

## 2022-09-06 DIAGNOSIS — R52 PAIN: Primary | ICD-10-CM

## 2022-09-06 PROCEDURE — 97110 THERAPEUTIC EXERCISES: CPT | Mod: CQ

## 2022-09-06 PROCEDURE — 97112 NEUROMUSCULAR REEDUCATION: CPT | Mod: CQ

## 2022-09-06 NOTE — PROGRESS NOTES
[facility]   Physical Therapy Treatment Note     Name: Devon Chapa  Clinic Number: [id number]  Physician: Chaparrita Parra NP  Visit Date: 9/6/2022   Therapy Diagnosis:        Encounter Diagnoses   Name Primary?    Knee pain Yes    Low back pain      Pain      Decreased strength      Decreased range of motion        Physician Orders: PT Eval and Treat   Medical Diagnosis from Referral: Low back and bilateral upper back pain  Evaluation Date: 7/29/2022  Authorization Period Expiration: 11/16/22  Plan of Care Expiration: 9/28/22  Visit # / Visits authorized: 9/ 15    Time In: 04:42 pm  Time Out: 5:32 pm  Total Billable Time: 50 minutes    SUBJECTIVE     Pt reports: Everything is about the same, pain is down both legs upon arrival today.     He was compliant with home exercise program.  Response to previous treatment: It was good  Functional change: n/a    Pain: 5/10  Location: bilateral back and lower extremity   Precautions: Standard and Diabetes     OBJECTIVE     Objective Measures updated at progress report unless specified.     Treatment     Devon received the treatments listed below:      therapeutic exercises to develop strength, endurance, ROM, flexibility, posture and core stabilization for 25 minutes including:    Bike x 5 min  Slant board 4 x 20 seconds  Hamstring stretch 4 x 20 seconds bilateral   Cybex back extension 3 x 10 4#  Bilateral piriformis stretch on bench 3 x 15 second hold ea  Bilateral shoulder extension blue x 30  Standing external rotation blue x 30  Standing internal rotation blue x 30 NTV  Lat pull down 3pl x 30  Sitting cable flexion/extension 4pl x 30 NTV    neuromuscular re-education activities to improve: Coordination, Sense, Proprioception and Posture for 10 minutes. The following activities were included:  Cybex Rows- Close 3 x 10 5 plates   Cybex Rows - Wide 3 x 10 5 plates      Bilateral scapular retractions with extension  Blue TB 20x 2 second hold     Bilateral horizontal abduction with scapular retractions Blue TB 30x 2 second hold        supervised modalities after being cleared for contradictions: TENS:  Devon received TENS electrical stimulation for pain to the low back lumbar spine. Pt received continuous mode  for 15 minutes. Devon tolerated treatment well without any adverse effects.       Patient Education and Home Exercises     Home Exercises Provided and Patient Education Provided     Education provided:   - home exercise program instruction with handout given. Pt demo understanding by verbal agreement.     Written Home Exercises Provided: yes. Exercises were reviewed and Devon was able to demonstrate them prior to the end of the session.  Devon demonstrated good  understanding of the education provided. See EMR under Patient Instructions for exercises provided during therapy sessions    ASSESSMENT     Therapist focused on scapular stabilization and posterior shoulder exercises with some lower back exercises. Added bilateral piriformis stretch due to p! In BLE/hips today. Pt expressed relief with this but is significantly stiffer on LLE more so than RLE. Verbalized scapular retraction during all exercises to promote neuromuscular education for scapular stabilization. Pt tends to over activate bilateral UT if not given cues. Finished with modalities for pain control x 15 minutes.    PMH:   Devon is a 51 y.o. male referred to outpatient Physical Therapy with a medical diagnosis of low back pain and muscular tightness across bilateral upper trapezius. Patient presents with right trigger point tenderness of the upper trapezius. Patient has normal shoulder range of motion and good scapulothoracic rhythm but may need myofascial release, dry needling and scapular stabilization to ease muscle tension across the shoulders. Patient demonstrates weak core stabilization but normal pelvic mobility and positioning. Patient  demonstrates good upper and lower extremities strength and normal cervical range of motion. At this time, patient will benefit from skilled physical therapy to address deficits with core and hip stabilization, stretching, modalities, dry needling, myofascial release and scapular stabilization exercises. Patient received dry needling today and states he felt a deep throbbing during the needle application but did not report a significant change in muscle tension after.        Devon Is progressing towards his goals.   Pt prognosis is Good.     Pt will continue to benefit from skilled outpatient physical therapy to address the deficits listed in the problem list box on initial evaluation, provide pt/family education and to maximize pt's level of independence in the home and community environment.     Pt's spiritual, cultural and educational needs considered and pt agreeable to plan of care and goals.     Anticipated Barriers for therapy: none      Goals:  Short Term Goals: 4 weeks   1. Patient will have 4+/5 for bilateral glute max manual muscle test for stability  2. Patient will be able to maintain prolonged positions including driving for 1 hour with 2/10 pain  3. Patient will be able to lie supine at night to sleep without waking from pain     Long Term Goals: 8 weeks   1. Patient will have 5/5 bilateral lower extremities manual muscle test   2. Patient will be able to maintain prolonged positions for as long as desired with 0/10 pain    PLAN     Plan of care Certification: 8/29/22 to 9/28/22   2 times per week for 8 weeks: Cervical/Lumbar Traction, Electrical Stimulation IFC/Premod, Iontophoresis (with Dex), Manual Therapy, Moist Heat/ Ice, Neuromuscular Re-ed, Patient Education, Therapeutic Exercise, Ultrasound, and Dry Needling  Other Recommendations: none      Misha Victoria, PTA    9/6/2022

## 2022-09-08 ENCOUNTER — CLINICAL SUPPORT (OUTPATIENT)
Dept: REHABILITATION | Facility: HOSPITAL | Age: 52
End: 2022-09-08
Attending: NURSE PRACTITIONER
Payer: OTHER GOVERNMENT

## 2022-09-08 DIAGNOSIS — R52 PAIN: Primary | ICD-10-CM

## 2022-09-08 DIAGNOSIS — R53.1 DECREASED STRENGTH: ICD-10-CM

## 2022-09-08 DIAGNOSIS — M25.60 DECREASED RANGE OF MOTION: ICD-10-CM

## 2022-09-08 PROCEDURE — 97014 ELECTRIC STIMULATION THERAPY: CPT

## 2022-09-08 PROCEDURE — 97110 THERAPEUTIC EXERCISES: CPT

## 2022-09-08 NOTE — PROGRESS NOTES
[facility]   Physical Therapy Treatment Note     Name: Devon Chapa  Clinic Number: [id number]  Physician: Chaparrita Parra NP  Visit Date: 9/8/2022   Therapy Diagnosis:        Encounter Diagnoses   Name Primary?    Knee pain Yes    Low back pain      Pain      Decreased strength      Decreased range of motion        Physician Orders: PT Eval and Treat   Medical Diagnosis from Referral: Low back and bilateral upper back pain  Evaluation Date: 7/29/2022  Authorization Period Expiration: 11/16/22  Plan of Care Expiration: 9/28/22  Visit # / Visits authorized: 10/ 15    Time In: 215pm  Time Out: 245pm   Total Billable Time: 30 minutes    SUBJECTIVE     Pt reports: Tightness in the upper back     He was compliant with home exercise program.  Response to previous treatment: It was good  Functional change: n/a    Pain: 4/10  Location: bilateral back and lower extremity   Precautions: Standard and Diabetes     OBJECTIVE     Objective Measures updated at progress report unless specified.     Treatment     Devon received the treatments listed below:      therapeutic exercises to develop strength, endurance, ROM, flexibility, posture and core stabilization for 15 minutes including:    Bike x 5 min  Slant board 4 x 20 seconds  Hamstring stretch 4 x 20 seconds bilateral   Cybex back extension 3 x 10 4#  Lat pull down 3pl x 30      (Not today)  Bilateral shoulder extension blue x 30  Standing external rotation blue x 30  Bilateral piriformis stretch on bench 3 x 15 second hold ea  Standing internal rotation blue x 30 NTV  Sitting cable flexion/extension 4pl x 30 NTV  (Not today)  neuromuscular re-education activities to improve: Coordination, Sense, Proprioception and Posture for 10 minutes. The following activities were included:  Cybex Rows- Close 3 x 10 5 plates   Cybex Rows - Wide 3 x 10 5 plates      Bilateral scapular retractions with extension  Blue TB 20x 2 second hold    Bilateral  horizontal abduction with scapular retractions Blue TB 30x 2 second hold        supervised modalities after being cleared for contradictions: IFC with MHP in prone:  Devon received IFC electrical stimulation for pain to the mid back spine. Pt received continuous mode  for 15 minutes. Devon tolerated treatment well without any adverse effects.       Patient Education and Home Exercises     Home Exercises Provided and Patient Education Provided     Education provided:   - home exercise program instruction with handout given. Pt demo understanding by verbal agreement.     Written Home Exercises Provided: yes. Exercises were reviewed and Devon was able to demonstrate them prior to the end of the session.  Devon demonstrated good  understanding of the education provided. See EMR under Patient Instructions for exercises provided during therapy sessions    ASSESSMENT     Therapist focused on scapular stabilization and posterior shoulder exercises. Patient was 30 minutes late so treatment was shortened.   Finished with modalities for pain control x 15 minutes.    PMH:   Devon is a 51 y.o. male referred to outpatient Physical Therapy with a medical diagnosis of low back pain and muscular tightness across bilateral upper trapezius. Patient presents with right trigger point tenderness of the upper trapezius. Patient has normal shoulder range of motion and good scapulothoracic rhythm but may need myofascial release, dry needling and scapular stabilization to ease muscle tension across the shoulders. Patient demonstrates weak core stabilization but normal pelvic mobility and positioning. Patient demonstrates good upper and lower extremities strength and normal cervical range of motion. At this time, patient will benefit from skilled physical therapy to address deficits with core and hip stabilization, stretching, modalities, dry needling, myofascial release and scapular stabilization exercises.  Patient received dry needling today and states he felt a deep throbbing during the needle application but did not report a significant change in muscle tension after.        Devon Is progressing towards his goals.   Pt prognosis is Good.     Pt will continue to benefit from skilled outpatient physical therapy to address the deficits listed in the problem list box on initial evaluation, provide pt/family education and to maximize pt's level of independence in the home and community environment.     Pt's spiritual, cultural and educational needs considered and pt agreeable to plan of care and goals.     Anticipated Barriers for therapy: none      Goals:  Short Term Goals: 4 weeks   1. Patient will have 4+/5 for bilateral glute max manual muscle test for stability  2. Patient will be able to maintain prolonged positions including driving for 1 hour with 2/10 pain  3. Patient will be able to lie supine at night to sleep without waking from pain     Long Term Goals: 8 weeks   1. Patient will have 5/5 bilateral lower extremities manual muscle test   2. Patient will be able to maintain prolonged positions for as long as desired with 0/10 pain    PLAN     Plan of care Certification: 8/29/22 to 9/28/22   2 times per week for 8 weeks: Cervical/Lumbar Traction, Electrical Stimulation IFC/Premod, Iontophoresis (with Dex), Manual Therapy, Moist Heat/ Ice, Neuromuscular Re-ed, Patient Education, Therapeutic Exercise, Ultrasound, and Dry Needling  Other Recommendations: none      KATEY PALACIOS, PT    9/8/2022

## 2022-09-12 ENCOUNTER — CLINICAL SUPPORT (OUTPATIENT)
Dept: REHABILITATION | Facility: HOSPITAL | Age: 52
End: 2022-09-12
Attending: NURSE PRACTITIONER
Payer: OTHER GOVERNMENT

## 2022-09-12 DIAGNOSIS — M25.60 DECREASED RANGE OF MOTION: ICD-10-CM

## 2022-09-12 DIAGNOSIS — M54.50 CHRONIC BILATERAL LOW BACK PAIN WITHOUT SCIATICA: ICD-10-CM

## 2022-09-12 DIAGNOSIS — M25.511 ACUTE PAIN OF RIGHT SHOULDER: ICD-10-CM

## 2022-09-12 DIAGNOSIS — M54.9 UPPER BACK PAIN: ICD-10-CM

## 2022-09-12 DIAGNOSIS — G89.29 CHRONIC BILATERAL LOW BACK PAIN WITHOUT SCIATICA: ICD-10-CM

## 2022-09-12 DIAGNOSIS — R52 PAIN: Primary | ICD-10-CM

## 2022-09-12 DIAGNOSIS — R53.1 DECREASED STRENGTH: ICD-10-CM

## 2022-09-12 PROCEDURE — 97110 THERAPEUTIC EXERCISES: CPT

## 2022-09-12 PROCEDURE — 97014 ELECTRIC STIMULATION THERAPY: CPT

## 2022-09-12 NOTE — PROGRESS NOTES
[facility]   Physical Therapy Discharge Summary     Name: Devon Chapa  Clinic Number: [id number]  Physician: Chaparrita Parra NP  Visit Date: 9/12/2022   Therapy Diagnosis:        Encounter Diagnoses   Name Primary?    Knee pain Yes    Low back pain      Pain      Decreased strength      Decreased range of motion        Physician Orders: PT Eval and Treat   Medical Diagnosis from Referral: Low back and bilateral upper back pain  Evaluation Date: 7/29/2022  Authorization Period Expiration: 11/16/22  Plan of Care Expiration: 9/28/22  Visit # / Visits authorized: 11/ 15    Time In: 254pm  Time Out: 345 pm   Total Billable Time: 53  minutes    SUBJECTIVE     Pt reports: soreness/aggravation in the upper back     He was compliant with home exercise program.  Response to previous treatment: It was good  Functional change: n/a    Pain: 3/10  Location: bilateral back and lower extremity   Precautions: Standard and Diabetes     OBJECTIVE     Objective Measures updated at progress report unless specified.     Treatment     Devon received the treatments listed below:      therapeutic exercises to develop strength, endurance, ROM, flexibility, posture and core stabilization for 40 minutes including:    Bike x 5 min  Slant board 4 x 20 seconds  Hamstring stretch 4 x 20 seconds bilateral   Cybex back extension 3 x 10 4#  Lat pull down 3pl x 30  Bilateral horizontal shoulder abduction blue x 30  Standing external rotation blue x 30  Cybex leg press 6pl x 30  Cybex rows 6pl x 30 each handle       supervised modalities after being cleared for contradictions: IFC 4pole x 15 minutes with MHP in prone:  Devon received IFC electrical stimulation for pain to the mid back spine. Pt received continuous mode for 15 minutes. Devon tolerated treatment well without any adverse effects.       Patient Education and Home Exercises     Home Exercises Provided and Patient Education Provided      Education provided:   - home exercise program instruction with handout given. Pt demo understanding by verbal agreement.     Written Home Exercises Provided: yes. Exercises were reviewed and Devon was able to demonstrate them prior to the end of the session.  Devon demonstrated good  understanding of the education provided. See EMR under Patient Instructions for exercises provided during therapy sessions    ASSESSMENT     Patient will be gone next week and insurance dates will run out. Patient has decided to continue with a home workout and take what he has learned in therapy to do on his own. Will follow up with MD in October.     PMH:   Devon is a 51 y.o. male referred to outpatient Physical Therapy with a medical diagnosis of low back pain and muscular tightness across bilateral upper trapezius. Patient presents with right trigger point tenderness of the upper trapezius. Patient has normal shoulder range of motion and good scapulothoracic rhythm but may need myofascial release, dry needling and scapular stabilization to ease muscle tension across the shoulders. Patient demonstrates weak core stabilization but normal pelvic mobility and positioning. Patient demonstrates good upper and lower extremities strength and normal cervical range of motion. At this time, patient will benefit from skilled physical therapy to address deficits with core and hip stabilization, stretching, modalities, dry needling, myofascial release and scapular stabilization exercises. Patient received dry needling today and states he felt a deep throbbing during the needle application but did not report a significant change in muscle tension after.        Devon Is progressing towards his goals.   Pt prognosis is Good.     Pt will continue to benefit from skilled outpatient physical therapy to address the deficits listed in the problem list box on initial evaluation, provide pt/family education and to maximize pt's  level of independence in the home and community environment.     Pt's spiritual, cultural and educational needs considered and pt agreeable to plan of care and goals.     Anticipated Barriers for therapy: none      Goals:  Short Term Goals: 4 weeks   1. Patient will have 4+/5 for bilateral glute max manual muscle test for stability Goal Met  2. Patient will be able to maintain prolonged positions including driving for 1 hour with 2/10 pain Goal Not Met   3. Patient will be able to lie supine at night to sleep without waking from pain Goal Met      Long Term Goals: 8 weeks Goals Not Met   1. Patient will have 5/5 bilateral lower extremities manual muscle test   2. Patient will be able to maintain prolonged positions for as long as desired with 0/10 pain    Discharge reason: Patient has reached the maximum rehab potential for the present time and Patient has completed allowable visits authorized by insurance    Plan   This patient is discharged from Physical Therapy.    Date of Last visit: 9/12/22  Total Visits Received: 11      KATEY PALACIOS, PT

## 2023-05-26 ENCOUNTER — HOSPITAL ENCOUNTER (OUTPATIENT)
Dept: RADIOLOGY | Facility: HOSPITAL | Age: 53
Discharge: HOME | End: 2023-05-26
Attending: NURSE PRACTITIONER
Payer: OTHER GOVERNMENT

## 2023-05-26 DIAGNOSIS — M25.519 PAIN IN UNSPECIFIED SHOULDER: Primary | ICD-10-CM

## 2023-05-26 DIAGNOSIS — R52 PAIN: ICD-10-CM

## 2023-05-26 PROCEDURE — 72040 X-RAY EXAM NECK SPINE 2-3 VW: CPT | Mod: 26,,, | Performed by: STUDENT IN AN ORGANIZED HEALTH CARE EDUCATION/TRAINING PROGRAM

## 2023-05-26 PROCEDURE — 73130 XR HAND COMPLETE 3 VIEWS BILATERAL: ICD-10-PCS | Mod: 26,50,, | Performed by: STUDENT IN AN ORGANIZED HEALTH CARE EDUCATION/TRAINING PROGRAM

## 2023-05-26 PROCEDURE — 72070 X-RAY EXAM THORAC SPINE 2VWS: CPT | Mod: 26,,, | Performed by: STUDENT IN AN ORGANIZED HEALTH CARE EDUCATION/TRAINING PROGRAM

## 2023-05-26 PROCEDURE — 72100 X-RAY EXAM L-S SPINE 2/3 VWS: CPT | Mod: TC

## 2023-05-26 PROCEDURE — 73620 XR FOOT 2 VIEW BILATERAL: ICD-10-PCS | Mod: 26,50,, | Performed by: STUDENT IN AN ORGANIZED HEALTH CARE EDUCATION/TRAINING PROGRAM

## 2023-05-26 PROCEDURE — 73130 X-RAY EXAM OF HAND: CPT | Mod: 26,50,, | Performed by: STUDENT IN AN ORGANIZED HEALTH CARE EDUCATION/TRAINING PROGRAM

## 2023-05-26 PROCEDURE — 73030 XR SHOULDER COMPLETE 2 OR MORE VIEWS BILATERAL: ICD-10-PCS | Mod: 26,50,, | Performed by: RADIOLOGY

## 2023-05-26 PROCEDURE — 73130 X-RAY EXAM OF HAND: CPT | Mod: TC,50

## 2023-05-26 PROCEDURE — 72040 X-RAY EXAM NECK SPINE 2-3 VW: CPT | Mod: TC

## 2023-05-26 PROCEDURE — 72040 XR CERVICAL SPINE AP LATERAL: ICD-10-PCS | Mod: 26,,, | Performed by: STUDENT IN AN ORGANIZED HEALTH CARE EDUCATION/TRAINING PROGRAM

## 2023-05-26 PROCEDURE — 72070 XR THORACIC SPINE AP LATERAL: ICD-10-PCS | Mod: 26,,, | Performed by: STUDENT IN AN ORGANIZED HEALTH CARE EDUCATION/TRAINING PROGRAM

## 2023-05-26 PROCEDURE — 73030 X-RAY EXAM OF SHOULDER: CPT | Mod: TC,50

## 2023-05-26 PROCEDURE — 72100 XR LUMBAR SPINE AP AND LATERAL: ICD-10-PCS | Mod: 26,,, | Performed by: RADIOLOGY

## 2023-05-26 PROCEDURE — 73620 X-RAY EXAM OF FOOT: CPT | Mod: 26,50,, | Performed by: STUDENT IN AN ORGANIZED HEALTH CARE EDUCATION/TRAINING PROGRAM

## 2023-05-26 PROCEDURE — 73620 X-RAY EXAM OF FOOT: CPT | Mod: TC,50

## 2023-05-26 PROCEDURE — 72070 X-RAY EXAM THORAC SPINE 2VWS: CPT | Mod: TC

## 2023-05-26 PROCEDURE — 72100 X-RAY EXAM L-S SPINE 2/3 VWS: CPT | Mod: 26,,, | Performed by: RADIOLOGY

## 2023-05-26 PROCEDURE — 73030 X-RAY EXAM OF SHOULDER: CPT | Mod: 26,50,, | Performed by: RADIOLOGY

## 2023-06-05 NOTE — PROGRESS NOTES
See Plan of Care     Sup Visit performed today with KODY Rosales and KODY Cates.  All goals and treatment plan reviewed. Will work toward completion of all goals set.     First Treatment May Include :       Back Exercises    Bike    Calf Stretch    Hamstring Stretch    Cybex Back    Cybex Abdominals    Cybex Leg Press -  Bilateral     Cybex Leg Press -  Single     Cybex Hip - Abduction    Cybex Hip - Flexion    Cybex Hip - Extension    Cybex Rows- Close    Cybex Rows - Wide    Cybex Shoulder Press                           Neuro Re- Education       Shoulder Int Rot and Ext Rot    Shoulder Flex and Abduction    Shoulder Ext/Scap Retraction    Horizontal Abduction    Bilateral External Rotation

## 2023-06-06 ENCOUNTER — CLINICAL SUPPORT (OUTPATIENT)
Dept: REHABILITATION | Facility: HOSPITAL | Age: 53
End: 2023-06-06
Payer: OTHER GOVERNMENT

## 2023-06-06 DIAGNOSIS — M25.511 ACUTE PAIN OF RIGHT SHOULDER: Primary | ICD-10-CM

## 2023-06-06 DIAGNOSIS — M54.9 UPPER BACK PAIN: ICD-10-CM

## 2023-06-06 DIAGNOSIS — M54.50 CHRONIC BILATERAL LOW BACK PAIN WITHOUT SCIATICA: ICD-10-CM

## 2023-06-06 DIAGNOSIS — M25.519 PAIN IN UNSPECIFIED SHOULDER: ICD-10-CM

## 2023-06-06 DIAGNOSIS — G89.29 CHRONIC BILATERAL LOW BACK PAIN WITHOUT SCIATICA: ICD-10-CM

## 2023-06-06 PROBLEM — R53.1 DECREASED STRENGTH: Status: RESOLVED | Noted: 2022-07-29 | Resolved: 2023-06-06

## 2023-06-06 PROBLEM — R52 PAIN: Status: RESOLVED | Noted: 2022-07-29 | Resolved: 2023-06-06

## 2023-06-06 PROBLEM — M25.60 DECREASED RANGE OF MOTION: Status: RESOLVED | Noted: 2022-07-29 | Resolved: 2023-06-06

## 2023-06-06 PROCEDURE — 97162 PT EVAL MOD COMPLEX 30 MIN: CPT

## 2023-06-06 NOTE — PLAN OF CARE
[facility]   Physical Therapy Initial Evaluation      Name: Devon Chapa  Clinic Number: [id number]    Therapy Diagnosis: Right Shoulder, Upper Back, and Low Back Pain   Physician: Trisha Montilla*    Physician Orders: PT Eval and Treat   Medical Diagnosis from Referral:  Right Shoulder, Upper Back, and Low Back Pain   Evaluation Date: 6/6/2023  Authorization Period Expiration: 10/4/2023  Plan of Care Expiration: 8/18/2023  Visit # / Visits authorized: 1/ 15   FOTO: 60/100    Precautions: Standard     Time In: 2:00 pm   Time Out: 2:55 pm   Total Appointment Time (timed & untimed codes): 55 minutes    Subjective     Date of onset: 3/1/2023    History of current condition - Devon reports: He has been having pain for several months but the pain has increased. Most of the pain is in the Right Shoulder, Upper Back, and Lower Back. He does have intermittent pain in the Left Knee. Patient reports he is usually very active and enjoys running and working in his yard. He has been unable to do this for some time now. Patient saw MD on 5/26/2023. MD referred patient to Outpatient Physical Therapy.      Falls: No    Imaging:   Shoulder X Ray   FINDINGS:  Lumbar vertebral body heights and alignment appear maintained.  Mild scattered posterior facet arthropathy.  Mild loss of disc space height noted at several levels with minimal marginal vertebral body osteophyte formation.  Lumbar vertebral body heights and alignment appear maintained.  Impression:  Mild degenerative change of the lumbar spine.    Back X Ray   FINDINGS:  Mild degenerative change of the bilateral acromioclavicular joints.  No convincing acute fracture or dislocation demonstrated. No concerning radiopaque foreign body visualized.    Prior Therapy: None this year   Social History:  lives with their family  Occupation: In the  - does office work. Did work as a  in the  for years   Prior Level  "of Function: In 2020 he was running 3 miles at least twice a week  Current Level of Function: Wants to return to running and working in the yard.     Pain:  Current 3/10, worst 8/10, best 2/10   Location: right shoulder, Upper Back, Low Back, and occasionally the Left Knee   Description: Aching, Dull, Grabbing, and Tight  Aggravating Factors: Right arm movements aggravate the Right shoulder and upper back; Prolonged standing and walking cause low back pain  Easing Factors: relaxation, pain medication, lying down, and rest    Patients goals: "I want to get back to running and working in the yard."     Medical History:   No past medical history on file.    Surgical History:   Devon Chapa  has a past surgical history that includes Splenectomy, total.    Medications:   Devon has a current medication list which includes the following prescription(s): cyclobenzaprine, omeprazole, and sertraline.    Allergies:   Review of patient's allergies indicates:  No Known Allergies     Objective        Posture :     Standing Lordosis        []  Increased   [x]   Decreased   []  Within Normal Limits  Sitting Lordosis  []  Increased   [x]   Decreased   []  Within Normal Limits   Iliac Crest Height  []  Left/Right Increased      [x] Equal/Level  PSIS Height    []  Left/Right Increased      [x] Equal/Level  Pelvic Rot/Torsion       []   Yes     [x]   No  Scoliosis    [x]  Yes   Concave Right     []  No (Mild Thoracic Scoliosis)   Lateral Shift    []   Right     []   Left          [x]  Within Normal Limits    Strength :      Myotome/Muscle :  Left   Right     L1-2    Psoas  5/5  4+/5    L3       Quadriceps  5/5  5/5    L4       Anterior Tibialis  5/5   5/5    L5       EHL   5/5  5/5    S1      Gastocnemius  5/5  5/5     S2      Hamstrings  5/5  4+/5                    Strength :   Abdominals 3+/5  Back Extensors  3+/5  Multifidus 3/5      Range of Motion :     Back :    Forward Flexion 80 degrees    Back Bending 20 degrees "    Side Bending Left 45 degrees    Side Bending Right 45 degrees    Rotation Left - Normal    Rotation Right  Normal     Hip :   Hamstrings : Tight Bilateral   Piriformis :  Tight Bilateral       Special Tests :     SLR :   Right   +/- <60 Degrees     []   yes   [x]  No  ;   +/-  60-90 Degrees     []   Yes    [x]  No   Left     +/- <60 Degrees      []  yes    [x] No ;   +/-  60-90 Degrees       []   Yes    [x] No    Sitting Slump Test :  Left : [] Positive   [x]  Negative ;  Right : [] Positive   [x]  Negative   Lower Extremity Length :   [] Right/Left Longer     [x]  Within Normal Limits   RAMON : Left : [] Positive   [x]  Negative ;  Right : [x] Positive   []  Negative (Right Piriformis is tight and slightly painful with palpation)    Hip : Right Hip has tenderness over the Trochanteric Bursa    Right Shoulder : Patient was negative for all Rotator Cuff testing. Most shoulder pain appears to be arthritic in nature. Resistance of the Right shoulder causes strain/pain in the Upper back. Most pain centered around the scapular area, rhomboids, and Upper Traps.       Limitation/Restriction for FOTO Intake Shoulder Survey    Therapist reviewed FOTO scores for Devon Chapa on 6/6/2023.   FOTO documents entered into [employer] - see Media section.    Limitation Score: 40%         Patient Education and Home Exercises     Education provided:   - Discussed the findings from the Evaluation and Reviewed the Plan of Care.        Assessment     Devon is a 52 y.o. male referred to outpatient Physical Therapy with a medical diagnosis of Right Shoulder, Upper Back, and Low Back Pain. Devon reports: He has been having pain for several months but the pain has increased. Most of the pain is in the Right Shoulder, Upper Back, and Lower Back. He does have intermittent pain in the Left Knee. Patient reports he is usually very active and enjoys running and working in his yard. He has been unable to do this for some time now.  Patient saw MD on 5/26/2023. MD referred patient to Outpatient Physical Therapy. Patient presents with decreased lumbosacral motion and strength. He was negative for straight leg raise test. He does present with tight hamstrings and piriformis. He has history of pes planus causing plantar fascitis but he does have inserts that he wears in his work shoes and in his tennis shows. Patient Rotator cuff was cleared during special testing. Most of his pain with resisted shoulder motion is felt in the upper back/scapular region. Patient will benefit from skilled Physical Therapy intervention to address all deficits and help patient to return to their prior level of function.      Patient prognosis is Good.   Patient will benefit from skilled outpatient Physical Therapy to address the deficits stated above and in the chart below, provide patient /family education, and to maximize patientt's level of independence.     Plan of care discussed with patient: Yes  Patient's spiritual, cultural and educational needs considered and patient is agreeable to the plan of care and goals as stated below:     Anticipated Barriers for therapy: None    Medical Necessity is demonstrated by the following  History  Co-morbidities and personal factors that may impact the plan of care [] LOW: no personal factors / co-morbidities  [] MODERATE: 1-2 personal factors / co-morbidities  [x] HIGH: 3+ personal factors / co-morbidities    Moderate / High Support Documentation:   Co-morbidities affecting plan of care: Spleen removal; Degenerative changes in the spine; patellar tendonitis, diabetes, scoliosis    Personal Factors:   no deficits     Examination  Body Structures and Functions, activity limitations and participation restrictions that may impact the plan of care [] LOW: addressing 1-2 elements  [] MODERATE: 3+ elements  [x] HIGH: 4+ elements (please support below)    Moderate / High Support Documentation: Right shoulder, Upper Back, Low Back,  and Left Knee     Clinical Presentation [] LOW: stable  [x] MODERATE: Evolving  [] HIGH: Unstable     Decision Making/ Complexity Score: moderate         Goals:  Short Term Goals: 5 weeks   1. Patient will be Independent with Home Exercise Program   2. Patient will demonstrate with improved Posture and Body Mechanics  3. Patient will increase Lumbosacral Range of Motion by 25%   4. Patient will increase Lower Extremity and Core Strength to grossly 4+/5  5. Patient will increase Right Scapular strength from 4/5 to 5/5   6. Patient will have no complaints of Left Knee pain with Exercises   7. Patient will decrease complaints of pain with activity to Less than or Equal to 5/10     Long Term Goals: 10 weeks   1. Patient will tolerate 30 minutes of activity with complaints of Pain at Less Than or Equal to 4/10      Plan     Plan of care Certification: 6/6/2023 to 8/18/2023.    Outpatient Physical Therapy 2 times weekly for 10 weeks to include the following interventions: Cervical/Lumbar Traction, Electrical Stimulation to decrease pain and inflammation as needed, Manual Therapy, Moist Heat/ Ice, Neuromuscular Re-ed, Patient Education, Therapeutic Activities, and Therapeutic Exercise.     WILFRDIO MONTAÑO, PT, DPT

## 2023-08-16 ENCOUNTER — CLINICAL SUPPORT (OUTPATIENT)
Dept: REHABILITATION | Facility: HOSPITAL | Age: 53
End: 2023-08-16
Payer: OTHER GOVERNMENT

## 2023-08-16 DIAGNOSIS — M25.511 ACUTE PAIN OF RIGHT SHOULDER: Primary | ICD-10-CM

## 2023-08-16 DIAGNOSIS — M54.50 CHRONIC BILATERAL LOW BACK PAIN WITHOUT SCIATICA: ICD-10-CM

## 2023-08-16 DIAGNOSIS — G89.29 CHRONIC BILATERAL LOW BACK PAIN WITHOUT SCIATICA: ICD-10-CM

## 2023-08-16 DIAGNOSIS — M54.9 UPPER BACK PAIN: ICD-10-CM

## 2023-08-16 PROCEDURE — 97164 PT RE-EVAL EST PLAN CARE: CPT

## 2023-08-16 PROCEDURE — 97110 THERAPEUTIC EXERCISES: CPT

## 2023-08-16 NOTE — PROGRESS NOTES
See Re-Evaluation     Sup Visit performed today with KODY Rosales and KODY Cates.  All goals and treatment plan reviewed. Will work toward completion of all goals set.     First Treatment May Include :     Shoulder and Upper Back Exercises       UBE    Pulleys    Corner Stretch     Cane Flexion on Wall     Cane Flexion off Wall     Cybex Rows - Close     Cybex Rows - Wide    Cybex Shoulder Press    Cybex Lat Pull Down    Cybex/Cable Bicep Curls     Cybex/Cable Tricep Press Downs        Laterals    Bent Over Rows              Neuro Re-Education         Shoulder Int Rot and Ext Rot    Shoulder Flex and Abduction    Shoulder Ext/Scap Retraction    Horizontal Abduction    Bilateral External Rotation                  Low Back and Lower Extremity Exercises       Back Exercises    Bike    Calf Stretch    Hamstring Stretch    Cybex Back    Cybex Abdominals    Cybex Leg Press -  Bilateral     Cybex Leg Press -  Single     Cybex Hip - Abduction    Cybex Hip - Flexion    Cybex Hip - Extension    Cybex Rows- Close    Cybex Rows - Wide

## 2023-08-16 NOTE — PLAN OF CARE
[facility]   Physical Therapy Re-Evaluation      Name: Devon Chapa  Clinic Number: [id number]    Therapy Diagnosis: Right Shoulder, Upper Back, and Low Back Pain   Physician: Trisha Montilla*    Physician Orders: PT Eval and Treat   Medical Diagnosis from Referral:  Right Shoulder, Upper Back, and Low Back Pain   Evaluation Date: 6/6/2023  Re-Evaluation Date : 8/16/2023  Authorization Period Expiration: 10/4/2023  Plan of Care Expiration: 10/13/2023 (8 weeks)  Visit # / Visits authorized: 2/ 15   FOTO: 60/100    Precautions: Standard     Time In: 3:45 pm   Time Out: 4:45 pm   Total Appointment Time (timed & untimed codes): 55 minutes    Subjective     Date of onset: 3/1/2023    History of current condition - Devon reports: He came to Physical Therapy originally in June but has been unable to attend Therapy due to work restrictions and being out of state for training. He is back today to begin Therapy. Most of his pain now is Right Shoulder, Upper Back, and Low Back. He does have intermittent Left Knee pain and intermittent plantar fascitis. The Right foot is painful now due to a flare up of plantar fascitis.   Patient is normally very active and was able to run 3 miles each year for the  physical with no difficulty. At this time patient is unable to run due to pain in multiple joints and the spine. [facility] Physical Therapy. Re-Evaluation being done today to get a new baseline for all areas of pain and dysfunction.           Falls: No    Imaging:   Shoulder X Ray   FINDINGS:  Lumbar vertebral body heights and alignment appear maintained.  Mild scattered posterior facet arthropathy.  Mild loss of disc space height noted at several levels with minimal marginal vertebral body osteophyte formation.  Lumbar vertebral body heights and alignment appear maintained.  Impression:  Mild degenerative change of the  "lumbar spine.    Back X Ray   FINDINGS:  Mild degenerative change of the bilateral acromioclavicular joints.  No convincing acute fracture or dislocation demonstrated. No concerning radiopaque foreign body visualized.    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     Pain:  Current 3/10, worst 5-6/10, best 2/10   Location: right shoulder, Upper Back, Low Back, and occasionally the Left Knee and feet (plantar fascitis)   Description: Aching, Dull, Grabbing, and Tight  Aggravating Factors: Right arm movements aggravate the Right shoulder and upper back; Prolonged standing and walking cause low back pain  Easing Factors: relaxation, pain medication, lying down, and rest    Patients goals: "I want to get back to running and working in the yard."     Medical History:   No past medical history on file.    Surgical History:   Devon Chapa  has a past surgical history that includes Splenectomy, total.    Medications:   Devon has a current medication list which includes the following prescription(s): cyclobenzaprine, omeprazole, and sertraline.    Allergies:   Review of patient's allergies indicates:  No Known Allergies     Objective      Right Shoulder and Upper Back will be the priority initially. Will advance to the Low Back and Lower Extremity strengthening.         Observation :     Forward Head/Rounded Shoulders :  [x] Yes   [] No   Scapular Winging :  [] Yes   [x] No     Cervical Spine Clearing :  Patient has slightly decreased cervical mobility with bilateral side bending      Range of Motion/Strength :                  Left Extremity                                                                        Right Extremity "     AROM   PROM  Strength                  Location   AROM    PROM   Strength    170   5/5   Shoulder    Flexion  160   4/5    170                        Abduction   155                             Scaption       50                        ER in Adduction  45                             IR in Adduction       T7                        Functional IR  T10                             ER in 90 Scaption                              IR in 90 Scaption       Full    5/5   Elbow         Flexion  Full    5/5                           Extension                              Pron/Supination          Clinical Tests:  Apprehension test: negative  Neer's test: negative  Scour test: negative  O'daphne's test: positive  Drop arm test: negative  Empty can test: negative  Hawkin's chago test: positive    Right Shoulder : Patient was positive for impingement testing in Right Shoulder. Patient was negative for all Rotator Cuff testing. Most shoulder pain appears to be arthritic in nature. Resistance of the Right shoulder causes strain/pain in the Upper back. Most pain centered around the scapular area, rhomboids, and Upper Traps.       Posture :     Standing Lordosis        []  Increased   [x]   Decreased   []  Within Normal Limits  Sitting Lordosis  []  Increased   [x]   Decreased   []  Within Normal Limits   Iliac Crest Height  []  Left/Right Increased      [x] Equal/Level  PSIS Height    []  Left/Right Increased      [x] Equal/Level  Pelvic Rot/Torsion       []   Yes     [x]   No  Scoliosis    [x]  Yes   Concave Right     []  No (Mild Thoracic Scoliosis)   Lateral Shift    []   Right     []   Left          [x]  Within Normal Limits    Strength :      Myotome/Muscle :  Left   Right     L1-2    Psoas  5/5  4+/5    L3       Quadriceps  5/5  5/5    L4       Anterior Tibialis  5/5   5/5    L5       EHL   5/5  5/5    S1      Gastocnemius  5/5  5/5     S2      Hamstrings  5/5  4+/5                    Strength :   Abdominals 3+/5  Back  Extensors  3+/5  Multifidus 3/5      Range of Motion :     Back :    Forward Flexion 70 degrees    Back Bending 20 degrees    Side Bending Left 45 degrees    Side Bending Right 40 degrees    Rotation Left - Normal    Rotation Right  Normal     Hip :   Hamstrings : Tight Bilateral with Right worse than Left  Piriformis :  Tight Bilateral with Right worse than Left      Special Tests :     SLR :   Right   +/- <60 Degrees     []   yes   [x]  No  ;   +/-  60-90 Degrees     []   Yes    [x]  No   Left     +/- <60 Degrees      []  yes    [x] No ;   +/-  60-90 Degrees       []   Yes    [x] No    Sitting Slump Test :  Left : [] Positive   [x]  Negative ;  Right : [] Positive   [x]  Negative   Lower Extremity Length :   [] Right/Left Longer     [x]  Within Normal Limits   RAMON : Left : [] Positive   [x]  Negative ;  Right : [x] Positive   []  Negative (Right Piriformis is tight and slightly painful with palpation)    Hip : Right Hip has tenderness over the Trochanteric Bursa    Knee : Patient has intermittent pain in the Left Knee but none at this time. Has history of patellar tendinitis. Special tests were negative and ligaments appear intact.     Ankles : Patient has history of plantar fascitis bilaterally. At time of Re-Evaluation he is having symptoms of Right Plantar Fascitis. Dorsiflexion Range of Motion is 10 on the Right and 15 on the Left. All other ankle Range of Motion is Within Normal Limits.       Limitation/Restriction for FOTO Intake Shoulder Survey    Therapist reviewed FOTO scores for Devon Chapa on 6/6/2023.   FOTO documents entered into [employer] - see Media section.    Limitation Score: 40%         Patient Education and Home Exercises       Education provided:   - Discussed the findings from the Re-Evaluation, Reviewed the Plan of Care, and Instructed patient on their Home Exercise Program       Written Home Exercises Provided: yes. Exercises were reviewed and Meagan was able to demonstrate them prior to  the end of the session.  Meagan demonstrated good  understanding of the education provided. See EMR under Patient Instructions for exercises provided 8/16/2023.      Assessment     Devon is a 52 y.o. male referred to outpatient Physical Therapy with a medical diagnosis of Right Shoulder, Upper Back, and Low Back Pain.   Devon reports: He came to Physical Therapy originally in June but has been unable to attend Therapy due to work restrictions and being out of state for training. He is back today to begin Therapy. Most of his pain now is Right Shoulder, Upper Back, and Low Back. He does have intermittent Left Knee pain and intermittent plantar fascitis. The Right foot is painful now due to a flare up of plantar fascitis.   Patient is normally very active and was able to run 3 miles each year for the [employer] physical with no difficulty. At this time patient is unable to run due to pain in multiple joints and the spine. [facility] and [facility] receive [facility] Physical Therapy. Re-Evaluation being done today to get a new baseline for all areas of pain and dysfunction.   Shoulder : Patient has decreased strength and Range of Motion in the Right Shoulder. Patient has pain in Right Shoulder and Upper Back. Patient was negative on the Special Tests for all Rotator Cuff testing, but he was positive for the Impingement tests. Most shoulder pain appears to be arthritic in nature. Resistance of the Right shoulder causes strain/pain in the Upper back. Most pain centered around the scapular area, rhomboids, and Upper Traps.   Back : Patient has pain in the lumbar region but was not positive for straight leg raise or slump test. He does have decreased lumbosacral mobility and decreased strength in Core and Lower Extremity.   Hip : Right Hip has tenderness over the Trochanteric Bursa  Knee : Patient has intermittent pain in the Left Knee but none at this time. Has history of patellar  tendinitis. Special tests were negative and ligaments appear intact.   Ankles : Patient has history of plantar fascitis bilaterally. At time of Re-Evaluation he is having symptoms of Right Plantar Fascitis. Dorsiflexion Range of Motion is 10 on the Right and 15 on the Left. All other ankle Range of Motion is Within Normal Limits.   Patient will require Physical Therapy Intervention to address all deficits and work toward completion of all goals set. Therapist will refer patient back to MD upon completion of Therapy for further assessment and possible MRI if pain and dysfunction persist.       Patient prognosis is Good.   Patient will benefit from skilled outpatient Physical Therapy to address the deficits stated above and in the chart below, provide patient /family education, and to maximize patientt's level of independence.     Plan of care discussed with patient: Yes  Patient's spiritual, cultural and educational needs considered and patient is agreeable to the plan of care and goals as stated below:     Anticipated Barriers for therapy: None    Medical Necessity is demonstrated by the following  History  Co-morbidities and personal factors that may impact the plan of care [] LOW: no personal factors / co-morbidities  [] MODERATE: 1-2 personal factors / co-morbidities  [x] HIGH: 3+ personal factors / co-morbidities    Moderate / High Support Documentation:   Co-morbidities affecting plan of care: Spleen removal; Degenerative changes in the spine; patellar tendonitis, diabetes, scoliosis    Personal Factors:   no deficits     Examination  Body Structures and Functions, activity limitations and participation restrictions that may impact the plan of care [] LOW: addressing 1-2 elements  [] MODERATE: 3+ elements  [x] HIGH: 4+ elements (please support below)    Moderate / High Support Documentation: Right shoulder, Upper Back, Low Back, and Left Knee     Clinical Presentation [] LOW: stable  [x] MODERATE: Evolving  []  HIGH: Unstable     Decision Making/ Complexity Score: moderate         Goals:  Short Term Goals: 4 weeks   1. Patient will be Independent with Home Exercise Program   2. Patient will demonstrate with improved Posture and Body Mechanics  3. Patient will increase Lumbosacral Range of Motion by 25%   4. Patient will increase Lower Extremity and Core Strength to grossly 4+/5  5. Patient will increase Right Scapular strength from 4/5 to 5/5   6. Patient will have no complaints of Left Knee pain with Exercises   7. Patient will decrease complaints of pain with activity to Less than or Equal to 5/10     Long Term Goals: 8 weeks   1. Patient will tolerate 30 minutes of activity with complaints of Pain at Less Than or Equal to 2/10 overall    2. Patient will increase lumbosacral Range of Motion to Within Normal Limits   3. Patient will increase Right Shoulder Range of Motion to Within Normal Limits with no pain at end range   4. Patient will increase Dorsiflexion Range of Motion to 20 degrees bilaterally     Plan     Plan of care Certification: 8/16/2023 to 10/13/2023.    Outpatient Physical Therapy 2 times weekly for 8 weeks to include the following interventions: Cervical/Lumbar Traction, Electrical Stimulation to decrease pain and inflammation as needed, Manual Therapy, Moist Heat/ Ice, Neuromuscular Re-ed, Patient Education, Therapeutic Activities, and Therapeutic Exercise.     WILFRIDO MONTAÑO, PT, DPT

## 2023-08-21 ENCOUNTER — CLINICAL SUPPORT (OUTPATIENT)
Dept: REHABILITATION | Facility: HOSPITAL | Age: 53
End: 2023-08-21
Payer: OTHER GOVERNMENT

## 2023-08-21 DIAGNOSIS — M54.50 CHRONIC BILATERAL LOW BACK PAIN WITHOUT SCIATICA: ICD-10-CM

## 2023-08-21 DIAGNOSIS — M25.511 ACUTE PAIN OF RIGHT SHOULDER: Primary | ICD-10-CM

## 2023-08-21 DIAGNOSIS — G89.29 CHRONIC BILATERAL LOW BACK PAIN WITHOUT SCIATICA: ICD-10-CM

## 2023-08-21 DIAGNOSIS — M54.9 UPPER BACK PAIN: ICD-10-CM

## 2023-08-21 PROCEDURE — 97112 NEUROMUSCULAR REEDUCATION: CPT | Mod: CQ

## 2023-08-21 PROCEDURE — 97014 ELECTRIC STIMULATION THERAPY: CPT | Mod: CQ

## 2023-08-21 PROCEDURE — 97110 THERAPEUTIC EXERCISES: CPT | Mod: CQ

## 2023-08-21 NOTE — PROGRESS NOTES
[facility]   Physical Therapy Treatment Note      Name: Devon Chapa  Clinic Number: [id number]    Therapy Diagnosis:   Encounter Diagnoses   Name Primary?    Acute pain of right shoulder Yes    Upper back pain     Chronic bilateral low back pain without sciatica      Physician: Trisha Montilla*    Visit Date: 8/21/2023     Physician Orders: PT Eval and Treat   Medical Diagnosis from Referral:  Right Shoulder, Upper Back, and Low Back Pain   Evaluation Date: 6/6/2023  Re-Evaluation Date : 8/16/2023  Authorization Period Expiration: 10/4/2023  Plan of Care Expiration: 10/13/2023 (8 weeks)  Visit # / Visits authorized: 3 / 15   PTA Visit #: 1/5     FOTO: 60/100     Precautions: Standard      Time In: 4:02 pm   Time Out: 5:13 pm   Total Appointment Time (timed & untimed codes): 71 minutes    Subjective     Pt reports: his foot is bothering him a lot today. Feels like pins and needles every step he takes. States his lower back and hip is hurting also.   He was compliant with home exercise program.  Response to previous treatment: first visit since evaluation   Functional change: n/a    Pain: 4/10 (back) and 6/10 foot)  Location: bilateral back      Objective      Objective Measures updated at progress report unless specified.     Treatment     Devon received the treatments listed below:      therapeutic exercises to develop strength, endurance, ROM, flexibility, posture, and core stabilization for 25 minutes including:    Shoulder and Upper Back Exercises (not performed 8/21/2023 due to Patient wanting to focus more on the lower lumbar region, hip, and right foot (plantars fascitis.)      UBE    Pulleys    Corner Stretch     Cane Flexion on Wall     Cane Flexion off Wall             Cybex Shoulder Press    Cybex Lat Pull Down    Cybex/Cable Bicep Curls     Cybex/Cable Tricep Press Downs        Laterals    Bent Over Rows                Low Back and Lower Extremity Exercises      Back Exercises    Bike Nustep 5 minutes    Calf Stretch Gastroc and soleus 3 x 20 second hold    Seated ball rollouts  Fwd, left, and right 5 x 5 second hold each   Hamstring Stretch 2 sx 20 second hold on step (bilateral)   Heel raises off step  2 x 10 2   Heel drop stretch off step  2 x 15 second hold    Prostretch in standing  3 x 30 second hold (RLE)   Cybex Back 4 plates 3 x 10   Cybex Abdominals    Cybex Leg Press -  Bilateral  8 plates 3 x 10   Cybex Leg Press -  Single     Cybex Hip - Abduction    Cybex Hip - Flexion    Cybex Hip - Extension                                                 manual therapy techniques:  were applied to the: - for - minutes, including:      neuromuscular re-education activities to improve: Coordination, Kinesthetic, Sense, and Posture for 17 minutes. The following activities were included:    Cybex Rows- Close 4 plates x 20   Cybex Rows - Wide 4 plates x 20     Shoulder Int Rot and Ext Rot    Shoulder Flex and Abduction    Shoulder Ext/Scap Retraction 2 x 10 blue Theraband handles   Horizontal Abduction Red theraband handles 3 x 10   Bilateral External Rotation with scapular retraction Red 2 x 10               therapeutic activities to improve functional performance for -  minutes, including:      supervised modalities after being cleared for contradictions: IFC Electrical Stimulation:  Devon received IFC Electrical Stimulation for pain control applied to the lower lumbar region. Pt received stimulation for 15 minutes. Devon tolderated treatment well without any adverse effects.  hot pack for 15 minutes with 8 towel layers to lower lumbar spine in prone in conjunction with IFC Estim.      Patient Education and Home Exercises       Education provided:   - form correction, demonstration. Postural awareness with achieving scapular retraction without over activating upper trap with shoulder hike during exercises.     Written Home Exercises Provided: Patient instructed  to cont prior HEP. Exercises were reviewed and Devon was able to demonstrate them prior to the end of the session.  Devon demonstrated good  understanding of the education provided. See EMR under Patient Instructions for exercises provided during therapy sessions    Assessment     Supervisory visit with WILFRIDO MONTAÑO, PT, DPT  prior to initial PTA visit.     Today is the patient's first treatment since the Evaluation. Initiated the Plan of Care and instructed patient on proper form for all exercises today. Patient arrived with reports of his lower lumbar region being painful today as well as the right foot from his flare up of plantars fascitis. Centered treatment around addressing the lumbar pain and right ankle. He did fatigue with these exercises and required cues to achieve proper scapular retraction and avoid shoulder hike. Ended tx today with IFC 4 pole In prone with MHP. Patient reported feeling a little better at conclusion of tx. Good response noted, will continue to progress as able when he returns.       PMH from evaluation:  Devon is a 52 y.o. male referred to outpatient Physical Therapy with a medical diagnosis of Right Shoulder, Upper Back, and Low Back Pain.   Devon reports: He came to Physical Therapy originally in June but has been unable to attend Therapy due to work restrictions and being out of state for training. He is back today to begin Therapy. Most of his pain now is Right Shoulder, Upper Back, and Low Back. He does have intermittent Left Knee pain and intermittent plantar fascitis. The Right foot is painful now due to a flare up of plantar fascitis.   Patient is normally very active and was able to run 3 miles each year for the  physical with no difficulty. At this time patient is unable to run due to pain in multiple joints and the spine. [facility]. Re-Evaluation being done today to  get a new baseline for all areas of pain and dysfunction.   Shoulder : Patient has decreased strength and Range of Motion in the Right Shoulder. Patient has pain in Right Shoulder and Upper Back. Patient was negative on the Special Tests for all Rotator Cuff testing, but he was positive for the Impingement tests. Most shoulder pain appears to be arthritic in nature. Resistance of the Right shoulder causes strain/pain in the Upper back. Most pain centered around the scapular area, rhomboids, and Upper Traps.   Back : Patient has pain in the lumbar region but was not positive for straight leg raise or slump test. He does have decreased lumbosacral mobility and decreased strength in Core and Lower Extremity.   Hip : Right Hip has tenderness over the Trochanteric Bursa  Knee : Patient has intermittent pain in the Left Knee but none at this time. Has history of patellar tendinitis. Special tests were negative and ligaments appear intact.   Ankles : Patient has history of plantar fascitis bilaterally. At time of Re-Evaluation he is having symptoms of Right Plantar Fascitis. Dorsiflexion Range of Motion is 10 on the Right and 15 on the Left. All other ankle Range of Motion is Within Normal Limits.   Patient will require Physical Therapy Intervention to address all deficits and work toward completion of all goals set. Therapist will refer patient back to MD upon completion of Therapy for further assessment and possible MRI if pain and dysfunction persist.        Devon Is progressing well towards his goals.   Pt prognosis is Good.     Pt will continue to benefit from skilled outpatient physical therapy to address the deficits listed in the problem list box on initial evaluation, provide pt/family education and to maximize pt's level of independence in the home and community environment.     Pt's spiritual, cultural and educational needs considered and pt agreeable to plan of care and goals.     Anticipated barriers to  physical therapy: none       Goals:  Short Term Goals: 4 weeks   1. Patient will be Independent with Home Exercise Program   2. Patient will demonstrate with improved Posture and Body Mechanics  3. Patient will increase Lumbosacral Range of Motion by 25%   4. Patient will increase Lower Extremity and Core Strength to grossly 4+/5  5. Patient will increase Right Scapular strength from 4/5 to 5/5   6. Patient will have no complaints of Left Knee pain with Exercises   7. Patient will decrease complaints of pain with activity to Less than or Equal to 5/10      Long Term Goals: 8 weeks   1. Patient will tolerate 30 minutes of activity with complaints of Pain at Less Than or Equal to 2/10 overall    2. Patient will increase lumbosacral Range of Motion to Within Normal Limits   3. Patient will increase Right Shoulder Range of Motion to Within Normal Limits with no pain at end range   4. Patient will increase Dorsiflexion Range of Motion to 20 degrees bilaterally        Plan     Plan of care Certification: 8/16/2023 to 10/13/2023.     Outpatient Physical Therapy 2 times weekly for 8 weeks to include the following interventions: Cervical/Lumbar Traction, Electrical Stimulation to decrease pain and inflammation as needed, Manual Therapy, Moist Heat/ Ice, Neuromuscular Re-ed, Patient Education, Therapeutic Activities, and Therapeutic Exercise.        Misha Victoria, PTA    8/21/2023

## 2023-08-23 ENCOUNTER — CLINICAL SUPPORT (OUTPATIENT)
Dept: REHABILITATION | Facility: HOSPITAL | Age: 53
End: 2023-08-23
Payer: OTHER GOVERNMENT

## 2023-08-23 DIAGNOSIS — G89.29 CHRONIC BILATERAL LOW BACK PAIN WITHOUT SCIATICA: ICD-10-CM

## 2023-08-23 DIAGNOSIS — M54.50 CHRONIC BILATERAL LOW BACK PAIN WITHOUT SCIATICA: ICD-10-CM

## 2023-08-23 DIAGNOSIS — M25.511 ACUTE PAIN OF RIGHT SHOULDER: Primary | ICD-10-CM

## 2023-08-23 DIAGNOSIS — M54.9 UPPER BACK PAIN: ICD-10-CM

## 2023-08-23 PROCEDURE — 97014 ELECTRIC STIMULATION THERAPY: CPT | Mod: CQ

## 2023-08-23 PROCEDURE — 97112 NEUROMUSCULAR REEDUCATION: CPT | Mod: CQ

## 2023-08-23 PROCEDURE — 97110 THERAPEUTIC EXERCISES: CPT | Mod: CQ

## 2023-08-23 NOTE — PROGRESS NOTES
[facility]   Physical Therapy Treatment Note      Name: Devon Chapa  Clinic Number: [id number]    Therapy Diagnosis:   Encounter Diagnoses   Name Primary?    Acute pain of right shoulder Yes    Upper back pain     Chronic bilateral low back pain without sciatica      Physician: Trisha Montilla*    Visit Date: 8/23/2023     Physician Orders: PT Eval and Treat   Medical Diagnosis from Referral:  Right Shoulder, Upper Back, and Low Back Pain   Evaluation Date: 6/6/2023  Re-Evaluation Date : 8/16/2023  Authorization Period Expiration: 10/4/2023  Plan of Care Expiration: 10/13/2023 (8 weeks)  Visit # / Visits authorized: 4 / 15   PTA Visit #: 2/5     FOTO: 60/100     Precautions: Standard      Time In: 03:58 pm   Time Out: 4:56 pm   Total Appointment Time (timed & untimed codes): 58  minutes    Subjective     Pt reports: his foot is a little better. Back and hips are still bothering him today.   He was compliant with home exercise program.  Response to previous treatment: felt ok  Functional change: n/a    Pain: 4/10 (back) and 4/10 foot)  Location: bilateral back      Objective      Objective Measures updated at progress report unless specified.     Treatment     Devon received the treatments listed below:      therapeutic exercises to develop strength, endurance, ROM, flexibility, posture, and core stabilization for 25 minutes including:    Shoulder and Upper Back Exercises (not performed 8/23/2023 due to Patient wanting to focus more on the lower lumbar region, hip, and right foot (plantars fascitis.)    UBE    Pulleys    Corner Stretch     Cane Flexion on Wall     Cane Flexion off Wall             Cybex Shoulder Press    Cybex Lat Pull Down    Cybex/Cable Bicep Curls     Cybex/Cable Tricep Press Downs        Laterals    Bent Over Rows                Low Back and Lower Extremity Exercises     Back Exercises    Bike Nustep 5 minutes    Calf Stretch Gastroc and soleus 3 x  20 second hold    Seated ball rollouts  Fwd, left, and right 5 x 5 second hold each   Hamstring Stretch 2 x 20 second hold on step (bilateral)   Heel raises off step  2 x 10    Heel drop stretch off step  2 x 15 second hold    Prostretch in standing  2 x 30 second hold (BLE)   Cybex Back 4 plates 3 x 10   Cybex Abdominals    Cybex Leg Press -  Bilateral  9 plates 3 x 10   Cybex Leg Press -  Single     Cybex Hip - Abduction (bilateral) 2 x 10 2 plates    Cybex Hip - Flexion    Cybex Hip - Extension (bilateral) 3 x 10 2 plates                                                 manual therapy techniques:  were applied to the: - for - minutes, including:      neuromuscular re-education activities to improve: Coordination, Kinesthetic, Sense, and Posture for 17 minutes. The following activities were included:    Cybex Rows- Close 5 plates x 20   Cybex Rows - Wide 5 plates x 20     Shoulder Int Rot and Ext Rot    Shoulder Flex and Abduction    Shoulder Ext/Scap Retraction 2 x 10 blue Theraband handles   Horizontal Abduction Red theraband handles 3 x 10   Bilateral External Rotation with scapular retraction Red 2 x 10               therapeutic activities to improve functional performance for -  minutes, including:      supervised modalities after being cleared for contradictions: IFC Electrical Stimulation:  Dveon received IFC Electrical Stimulation for pain control applied to the lower lumbar region. Pt received stimulation for 10 minutes. Devon tolderated treatment well without any adverse effects.  hot pack for 10 minutes with 8 towel layers to lower lumbar spine in prone in conjunction with IFC Estim.      Patient Education and Home Exercises       Education provided:   - form correction, demonstration. Postural awareness with achieving scapular retraction without over activating upper trap with shoulder hike during exercises.     Written Home Exercises Provided: Patient instructed to cont prior HEP. Exercises  were reviewed and Devon was able to demonstrate them prior to the end of the session.  Devon demonstrated good  understanding of the education provided. See EMR under Patient Instructions for exercises provided during therapy sessions    Assessment     Supervisory visit with WILFRIDO MONTAÑO, PT, DPT  prior to initial PTA visit.      Patient arrived with reports of his lower lumbar region and hips being painful today as well as the right foot from his flare up of plantars fascitis.however, since last treatment, he does state the foot is a little better.  Centered treatment around addressing the lumbar pain with strengthening exercises and right ankle. He did fatigue with these exercises and required cues to achieve proper scapular retraction and avoid shoulder hike. Added cybex hip abduction and extension today with fatigue noted. Ended tx today with IFC 4 pole In prone with MHP. Patient reported feeling a little better at conclusion of tx. Good response noted, will continue to progress as able when he returns.       PMH from evaluation:  Devon is a 52 y.o. male referred to outpatient Physical Therapy with a medical diagnosis of Right Shoulder, Upper Back, and Low Back Pain.   Devon reports: He came to Physical Therapy originally in June but has been unable to attend Therapy due to work restrictions and being out of state for training. He is back today to begin Therapy. Most of his pain now is Right Shoulder, Upper Back, and Low Back. He does have intermittent Left Knee pain and intermittent plantar fascitis. The Right foot is painful now due to a flare up of plantar fascitis.   Patient is normally very active and was able to run 3 miles each year for the  physical with no difficulty. At this time patient is unable to run due to pain in multiple joints and the spine. [facility] and [facility]. Re-Evaluation being done today to  get a new baseline for all areas of pain and dysfunction.   Shoulder : Patient has decreased strength and Range of Motion in the Right Shoulder. Patient has pain in Right Shoulder and Upper Back. Patient was negative on the Special Tests for all Rotator Cuff testing, but he was positive for the Impingement tests. Most shoulder pain appears to be arthritic in nature. Resistance of the Right shoulder causes strain/pain in the Upper back. Most pain centered around the scapular area, rhomboids, and Upper Traps.   Back : Patient has pain in the lumbar region but was not positive for straight leg raise or slump test. He does have decreased lumbosacral mobility and decreased strength in Core and Lower Extremity.   Hip : Right Hip has tenderness over the Trochanteric Bursa  Knee : Patient has intermittent pain in the Left Knee but none at this time. Has history of patellar tendinitis. Special tests were negative and ligaments appear intact.   Ankles : Patient has history of plantar fascitis bilaterally. At time of Re-Evaluation he is having symptoms of Right Plantar Fascitis. Dorsiflexion Range of Motion is 10 on the Right and 15 on the Left. All other ankle Range of Motion is Within Normal Limits.   Patient will require Physical Therapy Intervention to address all deficits and work toward completion of all goals set. Therapist will refer patient back to MD upon completion of Therapy for further assessment and possible MRI if pain and dysfunction persist.        Devon Is progressing well towards his goals.   Pt prognosis is Good.     Pt will continue to benefit from skilled outpatient physical therapy to address the deficits listed in the problem list box on initial evaluation, provide pt/family education and to maximize pt's level of independence in the home and community environment.     Pt's spiritual, cultural and educational needs considered and pt agreeable to plan of care and goals.     Anticipated barriers to  physical therapy: none       Goals:  Short Term Goals: 4 weeks   1. Patient will be Independent with Home Exercise Program   2. Patient will demonstrate with improved Posture and Body Mechanics  3. Patient will increase Lumbosacral Range of Motion by 25%   4. Patient will increase Lower Extremity and Core Strength to grossly 4+/5  5. Patient will increase Right Scapular strength from 4/5 to 5/5   6. Patient will have no complaints of Left Knee pain with Exercises   7. Patient will decrease complaints of pain with activity to Less than or Equal to 5/10      Long Term Goals: 8 weeks   1. Patient will tolerate 30 minutes of activity with complaints of Pain at Less Than or Equal to 2/10 overall    2. Patient will increase lumbosacral Range of Motion to Within Normal Limits   3. Patient will increase Right Shoulder Range of Motion to Within Normal Limits with no pain at end range   4. Patient will increase Dorsiflexion Range of Motion to 20 degrees bilaterally        Plan     Plan of care Certification: 8/16/2023 to 10/13/2023.     Outpatient Physical Therapy 2 times weekly for 8 weeks to include the following interventions: Cervical/Lumbar Traction, Electrical Stimulation to decrease pain and inflammation as needed, Manual Therapy, Moist Heat/ Ice, Neuromuscular Re-ed, Patient Education, Therapeutic Activities, and Therapeutic Exercise.        Misha Victoria, PTA    8/23/2023

## 2023-08-25 PROBLEM — M54.9 UPPER BACK PAIN: Status: RESOLVED | Noted: 2023-06-06 | Resolved: 2023-08-25

## 2023-08-25 PROBLEM — M25.511 ACUTE PAIN OF RIGHT SHOULDER: Status: RESOLVED | Noted: 2023-06-06 | Resolved: 2023-08-25

## 2023-08-25 PROBLEM — G89.29 CHRONIC BILATERAL LOW BACK PAIN WITHOUT SCIATICA: Status: RESOLVED | Noted: 2023-06-06 | Resolved: 2023-08-25

## 2023-08-25 PROBLEM — M54.50 CHRONIC BILATERAL LOW BACK PAIN WITHOUT SCIATICA: Status: RESOLVED | Noted: 2023-06-06 | Resolved: 2023-08-25

## 2023-08-28 ENCOUNTER — CLINICAL SUPPORT (OUTPATIENT)
Dept: REHABILITATION | Facility: HOSPITAL | Age: 53
End: 2023-08-28
Payer: OTHER GOVERNMENT

## 2023-08-28 DIAGNOSIS — M54.50 CHRONIC BILATERAL LOW BACK PAIN WITHOUT SCIATICA: ICD-10-CM

## 2023-08-28 DIAGNOSIS — M25.511 ACUTE PAIN OF RIGHT SHOULDER: Primary | ICD-10-CM

## 2023-08-28 DIAGNOSIS — G89.29 CHRONIC BILATERAL LOW BACK PAIN WITHOUT SCIATICA: ICD-10-CM

## 2023-08-28 PROCEDURE — 97110 THERAPEUTIC EXERCISES: CPT | Mod: CQ

## 2023-08-28 PROCEDURE — 97112 NEUROMUSCULAR REEDUCATION: CPT | Mod: CQ

## 2023-08-28 PROCEDURE — 97014 ELECTRIC STIMULATION THERAPY: CPT | Mod: CQ

## 2023-08-28 NOTE — PROGRESS NOTES
[facility]   Physical Therapy Treatment Note      Name: Devon Chapa  Clinic Number: [id number]  Therapy Diagnosis: Right Shoulder, Upper Back, and Low Back Pain   Physician: Trisha Montilla*  Visit Date: 8/28/2023     Physician Orders: PT Eval and Treat   Medical Diagnosis from Referral:  Right Shoulder, Upper Back, and Low Back Pain   Evaluation Date: 6/6/2023  Re-Evaluation Date : 8/16/2023  Authorization Period Expiration: 10/4/2023  Plan of Care Expiration: 10/13/2023 (8 weeks)  Visit # / Visits authorized: 5 / 15   PTA Visit #: 3/5     FOTO: 60/100     Precautions: Standard      Time In: 4:45 pm   Time Out: 5:42 pm   Total Appointment Time (timed & untimed codes): 57 minutes    Subjective     Pt reports: he mainly just has some soreness in the lower back today. Rode in the car a lot over the weekend.    He was compliant with home exercise program.  Response to previous treatment: felt ok  Functional change: n/a    Pain: 4/10 (back) and 4/10 foot)  Location: bilateral back      Objective      Objective Measures updated at progress report unless specified.     Treatment     Devon received the treatments listed below:      therapeutic exercises to develop strength, endurance, ROM, flexibility, posture, and core stabilization for 25 minutes including:    Shoulder and Upper Back Exercises (not performed 8/28/2023 due to Patient wanting to focus more on the lower lumbar region, hip, and right foot (plantars fascitis.)    UBE    Pulleys    Corner Stretch     Cane Flexion on Wall     Cane Flexion off Wall             Cybex Shoulder Press    Cybex Lat Pull Down    Cybex/Cable Bicep Curls     Cybex/Cable Tricep Press Downs        Laterals    Bent Over Rows              Low Back and Lower Extremity Exercises     Back Exercises    Bike  5 minutes    Calf Stretch Gastroc and soleus 3 x 20 second hold    Seated ball rollouts  Fwd, left, and right 5 x 5 second hold each    Hamstring Stretch 2 x 20 second hold on step (bilateral)   Heel raises off step  2 x 10    Heel drop stretch off step  2 x 15 second hold    Prostretch in standing  2 x 30 second hold (BLE)   Cybex Back 4 plates 3 x 10   Cybex Abdominals    Cybex Leg Press -  Bilateral  9 plates 3 x 10   Cybex Leg Press -  Single     Heel raises on leg press  7 plates x 20 (to neutral)   Cybex Hip - Abduction (bilateral) 2 x 10 2 plates    Cybex Hip - Flexion    Cybex Hip - Extension (bilateral) 2 x 10 2 plates                                                manual therapy techniques:  were applied to the: - for - minutes, including:    neuromuscular re-education activities to improve: Coordination, Kinesthetic, Sense, and Posture for 17 minutes. The following activities were included:    Cybex Rows- Close 5 plates x 20   Cybex Rows - Wide 5 plates x 20     Shoulder Int Rot and Ext Rot    Shoulder Flex and Abduction    Bilateral Shoulder Ext/Scap Retraction 3 x 10 blue Theraband handles   Bilateral Horizontal Abduction with scapular retraction green theraband handles 3 x 10   Bilateral External Rotation with scapular retraction Red 2 x 10   Face pulls 2 x 10 blue Theraband handles            therapeutic activities to improve functional performance for -  minutes, including:      supervised modalities after being cleared for contradictions: IFC Electrical Stimulation:  Devon received IFC Electrical Stimulation for pain control applied to the lower lumbar region. Pt received stimulation for 15 minutes. Devon tolderated treatment well without any adverse effects.  hot pack for 15 minutes with 8 towel layers to lower lumbar spine in prone in conjunction with IFC Estim.      Patient Education and Home Exercises       Education provided:   - form correction, demonstration. Postural awareness with achieving scapular retraction without over activating upper trap with shoulder hike during exercises.     Written Home Exercises  Provided: Patient instructed to cont prior HEP. Exercises were reviewed and Devon was able to demonstrate them prior to the end of the session.  Devon demonstrated good  understanding of the education provided. See EMR under Patient Instructions for exercises provided during therapy sessions    Assessment     Supervisory visit with WILFRIDO MONTAÑO, PT, DPT  prior to initial PTA visit.      Patient arrived with reports of his lower lumbar region and hips being sore/stiff today. He rode in the car for quite awhile over the weekend. Per Patient report, the right foot is a little better from his flare up of plantars fascitis, although we are still addressing this. Added heel raises today on cybex leg press which Patient tolerated well. Continued with focus of addressing the lumbar pain with strengthening exercises of the posterior chain. He did fatigue with these exercises and required cues to achieve proper scapular retraction and avoid shoulder hike. Added face pulls today with blue theraband handles and mod cuing to decrease the shoulder hike on that right side. Fatigue noted at conclusion of tx from progressions today. Ended tx today with IFC 4 pole In prone with MHP. Patient reported feeling a little better at conclusion of tx. Good response noted, will continue to progress as able when he returns.       PMH from evaluation:  Devon is a 52 y.o. male referred to outpatient Physical Therapy with a medical diagnosis of Right Shoulder, Upper Back, and Low Back Pain.   Devon reports: He came to Physical Therapy originally in June but has been unable to attend Therapy due to work restrictions and being out of state for training. He is back today to begin Therapy. Most of his pain now is Right Shoulder, Upper Back, and Low Back. He does have intermittent Left Knee pain and intermittent plantar fascitis. The Right foot is painful now due to a flare up of plantar fascitis.   Patient is normally very  active and was able to run 3 miles each year for the [employer] physical with no difficulty. At this time patient is unable to run due to pain in multiple joints and the spine. [facility]. Re-Evaluation being done today to get a new baseline for all areas of pain and dysfunction.   Shoulder : Patient has decreased strength and Range of Motion in the Right Shoulder. Patient has pain in Right Shoulder and Upper Back. Patient was negative on the Special Tests for all Rotator Cuff testing, but he was positive for the Impingement tests. Most shoulder pain appears to be arthritic in nature. Resistance of the Right shoulder causes strain/pain in the Upper back. Most pain centered around the scapular area, rhomboids, and Upper Traps.   Back : Patient has pain in the lumbar region but was not positive for straight leg raise or slump test. He does have decreased lumbosacral mobility and decreased strength in Core and Lower Extremity.   Hip : Right Hip has tenderness over the Trochanteric Bursa  Knee : Patient has intermittent pain in the Left Knee but none at this time. Has history of patellar tendinitis. Special tests were negative and ligaments appear intact.   Ankles : Patient has history of plantar fascitis bilaterally. At time of Re-Evaluation he is having symptoms of Right Plantar Fascitis. Dorsiflexion Range of Motion is 10 on the Right and 15 on the Left. All other ankle Range of Motion is Within Normal Limits.   Patient will require Physical Therapy Intervention to address all deficits and work toward completion of all goals set. Therapist will refer patient back to MD upon completion of Therapy for further assessment and possible MRI if pain and dysfunction persist.        Devon Is progressing well towards his goals.   Pt prognosis is Good.     Pt will continue to benefit from skilled outpatient physical therapy to address the deficits  listed in the problem list box on initial evaluation, provide pt/family education and to maximize pt's level of independence in the home and community environment.     Pt's spiritual, cultural and educational needs considered and pt agreeable to plan of care and goals.     Anticipated barriers to physical therapy: none       Goals:  Short Term Goals: 4 weeks   1. Patient will be Independent with Home Exercise Program   2. Patient will demonstrate with improved Posture and Body Mechanics  3. Patient will increase Lumbosacral Range of Motion by 25%   4. Patient will increase Lower Extremity and Core Strength to grossly 4+/5  5. Patient will increase Right Scapular strength from 4/5 to 5/5   6. Patient will have no complaints of Left Knee pain with Exercises   7. Patient will decrease complaints of pain with activity to Less than or Equal to 5/10      Long Term Goals: 8 weeks   1. Patient will tolerate 30 minutes of activity with complaints of Pain at Less Than or Equal to 2/10 overall    2. Patient will increase lumbosacral Range of Motion to Within Normal Limits   3. Patient will increase Right Shoulder Range of Motion to Within Normal Limits with no pain at end range   4. Patient will increase Dorsiflexion Range of Motion to 20 degrees bilaterally        Plan     Plan of care Certification: 8/16/2023 to 10/13/2023.     Outpatient Physical Therapy 2 times weekly for 8 weeks to include the following interventions: Cervical/Lumbar Traction, Electrical Stimulation to decrease pain and inflammation as needed, Manual Therapy, Moist Heat/ Ice, Neuromuscular Re-ed, Patient Education, Therapeutic Activities, and Therapeutic Exercise.        Misha Victoria, PTA    8/28/2023

## 2023-09-12 ENCOUNTER — CLINICAL SUPPORT (OUTPATIENT)
Dept: REHABILITATION | Facility: HOSPITAL | Age: 53
End: 2023-09-12
Payer: OTHER GOVERNMENT

## 2023-09-12 DIAGNOSIS — G89.29 CHRONIC BILATERAL LOW BACK PAIN WITHOUT SCIATICA: Primary | ICD-10-CM

## 2023-09-12 DIAGNOSIS — M54.9 UPPER BACK PAIN: ICD-10-CM

## 2023-09-12 DIAGNOSIS — R53.1 DECREASED STRENGTH: ICD-10-CM

## 2023-09-12 DIAGNOSIS — M54.50 CHRONIC BILATERAL LOW BACK PAIN WITHOUT SCIATICA: Primary | ICD-10-CM

## 2023-09-12 PROCEDURE — 97112 NEUROMUSCULAR REEDUCATION: CPT | Mod: CQ

## 2023-09-12 PROCEDURE — 97110 THERAPEUTIC EXERCISES: CPT | Mod: CQ

## 2023-09-12 PROCEDURE — 97014 ELECTRIC STIMULATION THERAPY: CPT | Mod: CQ

## 2023-09-12 NOTE — PROGRESS NOTES
[facility]   Physical Therapy Treatment Note      Name: Devon Chapa  Clinic Number: [id number]  Therapy Diagnosis: Right Shoulder, Upper Back, and Low Back Pain   Physician: Trisha Montilla*  Visit Date: 9/12/2023     Physician Orders: PT Eval and Treat   Medical Diagnosis from Referral:  Right Shoulder, Upper Back, and Low Back Pain   Evaluation Date: 6/6/2023  Re-Evaluation Date : 8/16/2023  Authorization Period Expiration: 10/4/2023  Plan of Care Expiration: 10/13/2023 (8 weeks)  Visit # / Visits authorized: 6 / 15   PTA Visit #: 4/5     FOTO: 60/100     Precautions: Standard      Time In: 4:22 pm   Time Out: 5:23 pm   Total Appointment Time (timed & untimed codes): 61 minutes    Subjective     Pt reports: he is doing ok today. Reports the foot has gotten a lot better from that recent flare up of plantars fascitis. States he mainly just has some soreness and weakness in the lower back.     He was compliant with home exercise program.  Response to previous treatment: felt ok  Functional change: n/a    Pain: 3/10 (back)   Location: bilateral back      Objective      Objective Measures updated at progress report unless specified.     Treatment     Devon received the treatments listed below:      therapeutic exercises to develop strength, endurance, ROM, flexibility, posture, and core stabilization for 25 minutes including:    Low Back and Lower Extremity Exercises     Back Exercises    Bike  5 minutes    Calf Stretch Gastroc and soleus 3 x 20 second hold    Seated ball rollouts  Fwd, left, and right 5 x 5 second hold each   Hamstring Stretch 2 x 20 second hold on step (bilateral)   TRX supermans 10 x 10 second hold    TRX hangouts 10 x 10 second hold        Cybex Back 4 plates 3 x 10   Cybex Abdominals    Cybex Leg Press -  Bilateral  10 plates 3 x 10   Cybex Leg Press -  Single     Cybex Hip - Abduction (bilateral) 2 x 10 2 plates    Cybex Hip - Flexion    Cybex Hip -  Extension (bilateral) 3 x 10 2 plates                                                manual therapy techniques:  were applied to the: - for - minutes, including:    neuromuscular re-education activities to improve: Coordination, Kinesthetic, Sense, and Posture for 17 minutes. The following activities were included:    Cybex Rows- Close 5 plates x 30   Cybex Rows - Wide 5 plates x 30     Shoulder Int Rot and Ext Rot    Shoulder Flex and Abduction    Bilateral Shoulder Ext/Scap Retraction 3 x 10 blue Theraband handles   Bilateral Horizontal Abduction with scapular retraction green theraband handles 3 x 10   Bilateral External Rotation with scapular retraction Red 2 x 10   Face pulls 2 x 10 blue Theraband handles    Cable palloff press #10 x 20 each (B)   Deadbugs with swiss ball  10x each (B)   SB abdominal crunch in supine  20x        therapeutic activities to improve functional performance for -  minutes, including:      supervised modalities after being cleared for contradictions: IFC Electrical Stimulation:  Devon received IFC Electrical Stimulation for pain control applied to the lower lumbar region. Pt received stimulation for 15 minutes. Lyndonamberly tolderated treatment well without any adverse effects.  hot pack for 15 minutes with 8 towel layers to lower lumbar spine in prone in conjunction with IFC Estim.      Patient Education and Home Exercises       Education provided:   - form correction, demonstration. Postural awareness with achieving scapular retraction without over activating upper trap with shoulder hike during exercises.     Written Home Exercises Provided: Patient instructed to cont prior HEP. Exercises were reviewed and Ronfarzad was able to demonstrate them prior to the end of the session.  Lyndonamberly demonstrated good  understanding of the education provided. See EMR under Patient Instructions for exercises provided during therapy sessions    Assessment     Supervisory visit with  WILFRIDO MONTAÑO, PT, DPT  prior to initial PTA visit.      Patient arrived with reports of his plantars fascitis flare up doing better but still remains with soreness and stiffness along the  lower lumbar region and hip area. Continued with focus of addressing the lumbar pain with strengthening exercises of the posterior chain and core stabilization. Added palloff press, SB isometrics in hooklying and also hooklying deadbugs with SB.  He did fatigue with these exercises and required cues to perform these with good form. Fatigue noted at conclusion of tx from progressions today. Ended tx today with premod In prone with MHP. Patient reported feeling a little better at conclusion of tx. Good response noted, will continue to progress as able when he returns.       PMH from evaluation:  Devon is a 52 y.o. male referred to outpatient Physical Therapy with a medical diagnosis of Right Shoulder, Upper Back, and Low Back Pain.   Devon reports: He came to Physical Therapy originally in June but has been unable to attend Therapy due to work restrictions and being out of state for training. He is back today to begin Therapy. Most of his pain now is Right Shoulder, Upper Back, and Low Back. He does have intermittent Left Knee pain and intermittent plantar fascitis. The Right foot is painful now due to a flare up of plantar fascitis.   Patient is normally very active and was able to run 3 miles each year for the  physical with no difficulty. At this time patient is unable to run due to pain in multiple joints and the spine. [facility] and [facility] Physical Therapy. Re-Evaluation being done today to get a new baseline for all areas of pain and dysfunction.   Shoulder : Patient has decreased strength and Range of Motion in the Right Shoulder. Patient has pain in Right Shoulder and Upper Back. Patient was negative on the Special Tests for all Rotator Cuff testing, but  he was positive for the Impingement tests. Most shoulder pain appears to be arthritic in nature. Resistance of the Right shoulder causes strain/pain in the Upper back. Most pain centered around the scapular area, rhomboids, and Upper Traps.   Back : Patient has pain in the lumbar region but was not positive for straight leg raise or slump test. He does have decreased lumbosacral mobility and decreased strength in Core and Lower Extremity.   Hip : Right Hip has tenderness over the Trochanteric Bursa  Knee : Patient has intermittent pain in the Left Knee but none at this time. Has history of patellar tendinitis. Special tests were negative and ligaments appear intact.   Ankles : Patient has history of plantar fascitis bilaterally. At time of Re-Evaluation he is having symptoms of Right Plantar Fascitis. Dorsiflexion Range of Motion is 10 on the Right and 15 on the Left. All other ankle Range of Motion is Within Normal Limits.   Patient will require Physical Therapy Intervention to address all deficits and work toward completion of all goals set. Therapist will refer patient back to MD upon completion of Therapy for further assessment and possible MRI if pain and dysfunction persist.        Devon Is progressing well towards his goals.   Pt prognosis is Good.     Pt will continue to benefit from skilled outpatient physical therapy to address the deficits listed in the problem list box on initial evaluation, provide pt/family education and to maximize pt's level of independence in the home and community environment.     Pt's spiritual, cultural and educational needs considered and pt agreeable to plan of care and goals.     Anticipated barriers to physical therapy: none       Goals:  Short Term Goals: 4 weeks   1. Patient will be Independent with Home Exercise Program   2. Patient will demonstrate with improved Posture and Body Mechanics  3. Patient will increase Lumbosacral Range of Motion by 25%   4. Patient will  increase Lower Extremity and Core Strength to grossly 4+/5  5. Patient will increase Right Scapular strength from 4/5 to 5/5   6. Patient will have no complaints of Left Knee pain with Exercises   7. Patient will decrease complaints of pain with activity to Less than or Equal to 5/10      Long Term Goals: 8 weeks   1. Patient will tolerate 30 minutes of activity with complaints of Pain at Less Than or Equal to 2/10 overall    2. Patient will increase lumbosacral Range of Motion to Within Normal Limits   3. Patient will increase Right Shoulder Range of Motion to Within Normal Limits with no pain at end range   4. Patient will increase Dorsiflexion Range of Motion to 20 degrees bilaterally        Plan     Plan of care Certification: 8/16/2023 to 10/13/2023.     Outpatient Physical Therapy 2 times weekly for 8 weeks to include the following interventions: Cervical/Lumbar Traction, Electrical Stimulation to decrease pain and inflammation as needed, Manual Therapy, Moist Heat/ Ice, Neuromuscular Re-ed, Patient Education, Therapeutic Activities, and Therapeutic Exercise.      Misha Victoria, PTA    9/12/2023

## 2023-09-14 ENCOUNTER — CLINICAL SUPPORT (OUTPATIENT)
Dept: REHABILITATION | Facility: HOSPITAL | Age: 53
End: 2023-09-14
Payer: OTHER GOVERNMENT

## 2023-09-14 DIAGNOSIS — M54.50 CHRONIC BILATERAL LOW BACK PAIN WITHOUT SCIATICA: Primary | ICD-10-CM

## 2023-09-14 DIAGNOSIS — G89.29 CHRONIC BILATERAL LOW BACK PAIN WITHOUT SCIATICA: Primary | ICD-10-CM

## 2023-09-14 PROCEDURE — 97014 ELECTRIC STIMULATION THERAPY: CPT | Mod: CQ

## 2023-09-14 PROCEDURE — 97110 THERAPEUTIC EXERCISES: CPT | Mod: CQ

## 2023-09-14 PROCEDURE — 97112 NEUROMUSCULAR REEDUCATION: CPT | Mod: CQ

## 2023-09-14 NOTE — PROGRESS NOTES
[facility]   Physical Therapy Treatment Note      Name: Devon Chapa  Clinic Number: [id number]  Therapy Diagnosis: Right Shoulder, Upper Back, and Low Back Pain   Physician: Trisha Montilla*  Visit Date: 9/14/2023     Physician Orders: PT Eval and Treat   Medical Diagnosis from Referral:  Right Shoulder, Upper Back, and Low Back Pain   Evaluation Date: 6/6/2023  Re-Evaluation Date : 8/16/2023  Authorization Period Expiration: 10/4/2023  Plan of Care Expiration: 10/13/2023 (8 weeks)  Visit # / Visits authorized: 9 / 15   PTA Visit #: 5/5     FOTO: 60/100     Precautions: Standard      Time In: 4:03 pm   Time Out: 5:06 pm   Total Appointment Time (timed & untimed codes): 63 minutes    Subjective     Pt reports: he was really sore in his upper back and shoulders on Wednesday from his last visit. States just some soreness/stiffness today.     He was compliant with home exercise program.  Response to previous treatment: felt ok  Functional change: n/a    Pain: 3-4/10 (back)   Location: bilateral back      Objective      Objective Measures updated at progress report unless specified.     Treatment     Devon received the treatments listed below:      therapeutic exercises to develop strength, endurance, ROM, flexibility, posture, and core stabilization for 18 minutes including:    Low Back and Lower Extremity Exercises     Back Exercises    Bike  5 minutes    Calf Stretch Gastroc and soleus 3 x 20 second hold    Prostretch in standing 2 x 30 second hold    Seated ball rollouts  Fwd, left, and right 5 x 5 second hold each   Hamstring Stretch 2 x 20 second hold on step (bilateral)   TRX supermans 10 x 10 second hold    TRX hangouts 10 x 10 second hold        Cybex Back 5 plates 3 x 10 (not performed today)    Cybex Abdominals    Cybex Leg Press -  Bilateral  10 plates 3 x 10 (not performed today)    Cybex Leg Press -  Single     Cybex Hip - Abduction (bilateral) 2 x 10 2 plates     Cybex Hip - Flexion    Cybex Hip - Extension (bilateral) 3 x 10 2 plates                                                manual therapy techniques:  were applied to the: - for - minutes, including:    neuromuscular re-education activities to improve: Coordination, Kinesthetic, Sense, and Posture for 25 minutes. The following activities were included:    Cybex Rows- Close 6 plates x 20   Cybex Rows - Wide 6 plates x 20     Shoulder Int Rot and Ext Rot    Shoulder Flex and Abduction    Bilateral Shoulder Ext/Scap Retraction 3 x 10 blue Theraband handles   Bilateral Horizontal Abduction with scapular retraction green theraband handles 3 x 10   Bilateral External Rotation with scapular retraction Green 2 x 10   Cable palloff press #10 x 20 each (B)   Deadbugs with swiss ball  10x each (B)   SB abdominal crunch in supine  20x    Reverse crunch with yellow SB 20x 5 second hold eccentric lower       therapeutic activities to improve functional performance for -  minutes, including:    Patient received the following supervised modalities after being cleared for contradictions: Pre-Mod Electrical Stimulation:  Patient received Pre-Mod Electrical Stimulation for pain control applied to the lumbar region. Pt received stimulation at a frequency of  Hz for 15 minutes. Patient tolerated treatment well without any adverse effects.  MHP in conjunction with PREMOD for 15 minutes         Patient Education and Home Exercises       Education provided:   - form correction, demonstration. Postural awareness with achieving scapular retraction without over activating upper trap with shoulder hike during exercises.     Written Home Exercises Provided: Patient instructed to cont prior HEP. Exercises were reviewed and Devon was able to demonstrate them prior to the end of the session.  Devon demonstrated good  understanding of the education provided. See EMR under Patient Instructions for exercises provided during therapy  sessions    Assessment     Supervisory visit with WILFRIDO MONTAÑO, PT, DPT  prior to initial PTA visit.      Patient arrived with reports of being sore from the last session this week. He states the stiffness and soreness in his lower back and hips seems to bother him most lately. Continued with focus of addressing the lumbar pain with strengthening exercises of the posterior chain and core stabilization. Added reverse crunches today with mat exercises and he did fatigue with this since it was followed by the cable palloff press and supine deadbug core exercises. He tolerated progression of resistive exercises today with increased repetitions noted on cybex rows hi and lo .  Ended tx today with premod In prone with MHP and multiple towel layers. Patient reported feeling a little better at conclusion of tx. Good response noted, will continue to progress as able when he returns.       PMH from evaluation:  Devon is a 52 y.o. male referred to outpatient Physical Therapy with a medical diagnosis of Right Shoulder, Upper Back, and Low Back Pain.   Devon reports: He came to Physical Therapy originally in June but has been unable to attend Therapy due to work restrictions and being out of state for training. He is back today to begin Therapy. Most of his pain now is Right Shoulder, Upper Back, and Low Back. He does have intermittent Left Knee pain and intermittent plantar fascitis. The Right foot is painful now due to a flare up of plantar fascitis.   Patient is normally very active and was able to run 3 miles each year for the [employer] physical with no difficulty. At this time patient is unable to run due to pain in multiple joints and the spine. [facility]. Re-Evaluation being done today to get a new baseline for all areas of pain and dysfunction.   Shoulder : Patient has decreased strength and Range of Motion in the Right Shoulder.  Patient has pain in Right Shoulder and Upper Back. Patient was negative on the Special Tests for all Rotator Cuff testing, but he was positive for the Impingement tests. Most shoulder pain appears to be arthritic in nature. Resistance of the Right shoulder causes strain/pain in the Upper back. Most pain centered around the scapular area, rhomboids, and Upper Traps.   Back : Patient has pain in the lumbar region but was not positive for straight leg raise or slump test. He does have decreased lumbosacral mobility and decreased strength in Core and Lower Extremity.   Hip : Right Hip has tenderness over the Trochanteric Bursa  Knee : Patient has intermittent pain in the Left Knee but none at this time. Has history of patellar tendinitis. Special tests were negative and ligaments appear intact.   Ankles : Patient has history of plantar fascitis bilaterally. At time of Re-Evaluation he is having symptoms of Right Plantar Fascitis. Dorsiflexion Range of Motion is 10 on the Right and 15 on the Left. All other ankle Range of Motion is Within Normal Limits.   Patient will require Physical Therapy Intervention to address all deficits and work toward completion of all goals set. Therapist will refer patient back to MD upon completion of Therapy for further assessment and possible MRI if pain and dysfunction persist.        Devon Is progressing well towards his goals.   Pt prognosis is Good.     Pt will continue to benefit from skilled outpatient physical therapy to address the deficits listed in the problem list box on initial evaluation, provide pt/family education and to maximize pt's level of independence in the home and community environment.     Pt's spiritual, cultural and educational needs considered and pt agreeable to plan of care and goals.     Anticipated barriers to physical therapy: none       Goals:  Short Term Goals: 4 weeks   1. Patient will be Independent with Home Exercise Program   2. Patient will  demonstrate with improved Posture and Body Mechanics  3. Patient will increase Lumbosacral Range of Motion by 25%   4. Patient will increase Lower Extremity and Core Strength to grossly 4+/5  5. Patient will increase Right Scapular strength from 4/5 to 5/5   6. Patient will have no complaints of Left Knee pain with Exercises   7. Patient will decrease complaints of pain with activity to Less than or Equal to 5/10      Long Term Goals: 8 weeks   1. Patient will tolerate 30 minutes of activity with complaints of Pain at Less Than or Equal to 2/10 overall    2. Patient will increase lumbosacral Range of Motion to Within Normal Limits   3. Patient will increase Right Shoulder Range of Motion to Within Normal Limits with no pain at end range   4. Patient will increase Dorsiflexion Range of Motion to 20 degrees bilaterally        Plan     Plan of care Certification: 8/16/2023 to 10/13/2023.     Outpatient Physical Therapy 2 times weekly for 8 weeks to include the following interventions: Cervical/Lumbar Traction, Electrical Stimulation to decrease pain and inflammation as needed, Manual Therapy, Moist Heat/ Ice, Neuromuscular Re-ed, Patient Education, Therapeutic Activities, and Therapeutic Exercise.      Misha Victoria, PTA    9/14/2023

## 2023-09-18 ENCOUNTER — CLINICAL SUPPORT (OUTPATIENT)
Dept: REHABILITATION | Facility: HOSPITAL | Age: 53
End: 2023-09-18
Payer: OTHER GOVERNMENT

## 2023-09-18 DIAGNOSIS — G89.29 CHRONIC BILATERAL LOW BACK PAIN WITHOUT SCIATICA: Primary | ICD-10-CM

## 2023-09-18 DIAGNOSIS — M54.50 CHRONIC BILATERAL LOW BACK PAIN WITHOUT SCIATICA: Primary | ICD-10-CM

## 2023-09-18 PROCEDURE — 97110 THERAPEUTIC EXERCISES: CPT

## 2023-09-18 PROCEDURE — 97112 NEUROMUSCULAR REEDUCATION: CPT

## 2023-09-18 PROCEDURE — 97014 ELECTRIC STIMULATION THERAPY: CPT

## 2023-09-18 NOTE — PROGRESS NOTES
[facility]   Physical Therapy Treatment Note      Name: Devon Chapa  Clinic Number: [id number]  Therapy Diagnosis: Right Shoulder, Upper Back, and Low Back Pain   Physician: Trisha Montilla*  Visit Date: 9/18/2023     Physician Orders: PT Eval and Treat   Medical Diagnosis from Referral:  Right Shoulder, Upper Back, and Low Back Pain   Evaluation Date: 6/6/2023  Re-Evaluation Date : 8/16/2023  Authorization Period Expiration: 10/4/2023  Plan of Care Expiration: 10/13/2023 (8 weeks)  Visit # / Visits authorized: 9 / 15   PTA Visit #: 5/5     FOTO: 60/100     Precautions: Standard      Time In: 4:15 pm   Time Out: 5:10 pm   Total Appointment Time (timed & untimed codes): 55 minutes    Subjective     Pt reports: his back is a little better. He did have muscle soreness after the last few Therapy visits. Plantar Fascitis of the Right foot is more tender today.     He was compliant with home exercise program.  Response to previous treatment: felt ok  Functional change: n/a    Pain: 2-3/10 (back); 6/10 Right Foot   Location: bilateral back      Objective      Objective Measures updated at progress report unless specified.     Treatment     Devon received the treatments listed below:      therapeutic exercises to develop strength, endurance, ROM, flexibility, posture, and core stabilization for 15 minutes including:    Low Back and Lower Extremity Exercises     Back Exercises    Bike  5 minutes    Calf Stretch Gastroc and soleus 3 x 20 second hold    Prostretch in standing 2 x 30 second hold    Seated ball rollouts  Fwd, left, and right 5 x 5 second hold each   Hamstring Stretch 2 x 20 second hold on step (bilateral)   TRX supermans 10 x 10 second hold    TRX hangouts 10 x 10 second hold        Cybex Back 5 plates 3 x 10 (not performed today)    Cybex Abdominals    Cybex Leg Press -  Bilateral  10 plates 3 x 10    Cybex Leg Press -  Single     Cybex Hip - Abduction (bilateral)  2 x 10 2 plates    Cybex Hip - Flexion    Cybex Hip - Extension (bilateral) 3 x 10 2 plates                                                manual therapy techniques:  were applied to the: - for - minutes, including:    neuromuscular re-education activities to improve: Coordination, Kinesthetic, Sense, and Posture for 25 minutes. The following activities were included:    Cybex Rows- Close 6 plates x 20   Cybex Rows - Wide 6 plates x 20     Shoulder Int Rot and Ext Rot    Shoulder Flex and Abduction    Bilateral Shoulder Ext/Scap Retraction 3 x 10 blue Theraband handles   Bilateral Horizontal Abduction with scapular retraction green theraband handles 3 x 10   Bilateral External Rotation with scapular retraction Green 2 x 10   Cable Rotation  #10 x 20 each (B)   Cable palloff press #10 x 20 each (B)   Deadbugs with swiss ball  10x each (B) -NTV   SB abdominal crunch in supine  20x -NTV   Reverse crunch with yellow SB 20x 5 second hold eccentric lower - NTV       therapeutic activities to improve functional performance for -  minutes, including:    Patient received the following supervised modalities after being cleared for contradictions: Pre-Mod Electrical Stimulation:  Patient received Pre-Mod Electrical Stimulation for pain control applied to the lumbar region. Pt received stimulation at a frequency of  Hz for 15 minutes. Patient tolerated treatment well without any adverse effects.  MHP in conjunction with PREMOD for 15 minutes         Patient Education and Home Exercises       Education provided:   - form correction, demonstration. Postural awareness with achieving scapular retraction without over activating upper trap with shoulder hike during exercises.     Written Home Exercises Provided: Patient instructed to cont prior HEP. Exercises were reviewed and Devon was able to demonstrate them prior to the end of the session.  Devon demonstrated good  understanding of the education provided. See  EMR under Patient Instructions for exercises provided during therapy sessions    Assessment        Patient continues to have pain in the Back, Right Shoulder, and the Right foot due to plantar fascitis. Therapist has designed a program that addresses all areas. We have to adjust this as needed each session to address the area that is most painful that day. Most of the treatment has centered around the Back. We are working on Core/Back Strengthening and shoulder/scapular strengthening. He works hard in Therapy and tolerates all the treatments well. Patient gets the most relief from the Pre-Mod and Moist Heat therefore we ended the session with this. We will continue to progress the patient as able. Sup Visit performed today with KODY Rosales.  All goals and treatment plan reviewed. Will work toward completion of all new goals set.           PMH from evaluation:  Devon is a 52 y.o. male referred to outpatient Physical Therapy with a medical diagnosis of Right Shoulder, Upper Back, and Low Back Pain.   Devon reports: He came to Physical Therapy originally in June but has been unable to attend Therapy due to work restrictions and being out of state for training. He is back today to begin Therapy. Most of his pain now is Right Shoulder, Upper Back, and Low Back. He does have intermittent Left Knee pain and intermittent plantar fascitis. The Right foot is painful now due to a flare up of plantar fascitis.   Patient is normally very active and was able to run 3 miles each year for the  physical with no difficulty. At this time patient is unable to run due to pain in multiple joints and the spine. [facility] and [facility] Physical Therapy. Re-Evaluation being done today to get a new baseline for all areas of pain and dysfunction.   Shoulder : Patient has decreased strength and Range of Motion in the Right Shoulder. Patient has pain in Right Shoulder and  Upper Back. Patient was negative on the Special Tests for all Rotator Cuff testing, but he was positive for the Impingement tests. Most shoulder pain appears to be arthritic in nature. Resistance of the Right shoulder causes strain/pain in the Upper back. Most pain centered around the scapular area, rhomboids, and Upper Traps.   Back : Patient has pain in the lumbar region but was not positive for straight leg raise or slump test. He does have decreased lumbosacral mobility and decreased strength in Core and Lower Extremity.   Hip : Right Hip has tenderness over the Trochanteric Bursa  Knee : Patient has intermittent pain in the Left Knee but none at this time. Has history of patellar tendinitis. Special tests were negative and ligaments appear intact.   Ankles : Patient has history of plantar fascitis bilaterally. At time of Re-Evaluation he is having symptoms of Right Plantar Fascitis. Dorsiflexion Range of Motion is 10 on the Right and 15 on the Left. All other ankle Range of Motion is Within Normal Limits.   Patient will require Physical Therapy Intervention to address all deficits and work toward completion of all goals set. Therapist will refer patient back to MD upon completion of Therapy for further assessment and possible MRI if pain and dysfunction persist.        Devon Is progressing well towards his goals.   Pt prognosis is Good.     Pt will continue to benefit from skilled outpatient physical therapy to address the deficits listed in the problem list box on initial evaluation, provide pt/family education and to maximize pt's level of independence in the home and community environment.     Pt's spiritual, cultural and educational needs considered and pt agreeable to plan of care and goals.     Anticipated barriers to physical therapy: none       Goals:  Short Term Goals: 4 weeks   1. Patient will be Independent with Home Exercise Program   2. Patient will demonstrate with improved Posture and Body  Mechanics  3. Patient will increase Lumbosacral Range of Motion by 25%   4. Patient will increase Lower Extremity and Core Strength to grossly 4+/5  5. Patient will increase Right Scapular strength from 4/5 to 5/5   6. Patient will have no complaints of Left Knee pain with Exercises   7. Patient will decrease complaints of pain with activity to Less than or Equal to 5/10      Long Term Goals: 8 weeks   1. Patient will tolerate 30 minutes of activity with complaints of Pain at Less Than or Equal to 2/10 overall    2. Patient will increase lumbosacral Range of Motion to Within Normal Limits   3. Patient will increase Right Shoulder Range of Motion to Within Normal Limits with no pain at end range   4. Patient will increase Dorsiflexion Range of Motion to 20 degrees bilaterally        Plan     Plan of care Certification: 8/16/2023 to 10/13/2023.     Outpatient Physical Therapy 2 times weekly for 8 weeks to include the following interventions: Cervical/Lumbar Traction, Electrical Stimulation to decrease pain and inflammation as needed, Manual Therapy, Moist Heat/ Ice, Neuromuscular Re-ed, Patient Education, Therapeutic Activities, and Therapeutic Exercise.      WILFRIDO MONTAÑO, PT, DPT    9/18/2023

## 2023-09-20 ENCOUNTER — CLINICAL SUPPORT (OUTPATIENT)
Dept: REHABILITATION | Facility: HOSPITAL | Age: 53
End: 2023-09-20
Payer: OTHER GOVERNMENT

## 2023-09-20 DIAGNOSIS — G89.29 CHRONIC BILATERAL LOW BACK PAIN WITHOUT SCIATICA: Primary | ICD-10-CM

## 2023-09-20 DIAGNOSIS — M54.50 CHRONIC BILATERAL LOW BACK PAIN WITHOUT SCIATICA: Primary | ICD-10-CM

## 2023-09-20 PROCEDURE — 97112 NEUROMUSCULAR REEDUCATION: CPT | Mod: CQ

## 2023-09-20 PROCEDURE — 97014 ELECTRIC STIMULATION THERAPY: CPT | Mod: CQ

## 2023-09-20 PROCEDURE — 97110 THERAPEUTIC EXERCISES: CPT | Mod: CQ

## 2023-09-20 NOTE — PROGRESS NOTES
[facility]   Physical Therapy Treatment Note      Name: Devon Chapa  Clinic Number: [id number]  Therapy Diagnosis: Right Shoulder, Upper Back, and Low Back Pain   Physician: Trisha Montilla*  Visit Date: 9/20/2023     Physician Orders: PT Eval and Treat   Medical Diagnosis from Referral:  Right Shoulder, Upper Back, and Low Back Pain   Evaluation Date: 6/6/2023  Re-Evaluation Date : 8/16/2023  Authorization Period Expiration: 10/4/2023  Plan of Care Expiration: 10/13/2023 (8 weeks)  Visit # / Visits authorized: 10 / 15   PTA Visit #: 1/5     FOTO: 60/100     Precautions: Standard      Time In: 3:57 pm   Time Out: 4:50  pm   Total Appointment Time (timed & untimed codes): 53 minutes    Subjective     Pt reports: his shoulder is sore today.     He was compliant with home exercise program.  Response to previous treatment: felt ok  Functional change: n/a    Pain: 2-3/10 (back); 4/10 Right shoulder  Location: bilateral back  and right shoulder    Objective      Objective Measures updated at progress report unless specified.     Treatment     Devon received the treatments listed below:      therapeutic exercises to develop strength, endurance, ROM, flexibility, posture, and core stabilization for 23 minutes including:    Low Back and Lower Extremity Exercises     Back Exercises    Bike  5 minutes    Calf Stretch Gastroc and soleus 3 x 20 second hold    Prostretch in standing 2 x 30 second hold    Seated ball rollouts  Fwd, left, and right 5 x 5 second hold each   Hamstring Stretch 2 x 20 second hold on step (bilateral)       Cybex Back 5 plates 3 x 10   Cybex Abdominals    Cybex Leg Press -  Bilateral  10 plates 3 x 10    Cybex Leg Press -  Single     Cybex Hip - Abduction (bilateral) 3 x 10 2 plates    Cybex Hip - Flexion    Cybex Hip - Extension (bilateral) 3 x 10 2 plates                                                manual therapy techniques:  were applied to the: - for  - minutes, including:    neuromuscular re-education activities to improve: Coordination, Kinesthetic, Sense, and Posture for 15 minutes. The following activities were included:    Cybex Rows- Close 6 plates x 20   Cybex Rows - Wide 6 plates x 20     Shoulder Int Rot and Ext Rot    Shoulder Flex and Abduction    Bilateral Shoulder Ext/Scap Retraction 3 x 10 blue Theraband handles   Bilateral Horizontal Abduction with scapular retraction green theraband handles 3 x 10   Bilateral External Rotation with scapular retraction Green 2 x 10 (not performed today)    Cable Rotation  #10 x 20 each (B)       therapeutic activities to improve functional performance for -  minutes, including:    Patient received the following supervised modalities after being cleared for contradictions: Pre-Mod Electrical Stimulation:  Patient received Pre-Mod Electrical Stimulation for pain control applied to the lumbar region and right shoulder Pt received stimulation at a frequency of  Hz for 15 minutes. Patient tolerated treatment well without any adverse effects.  MHP in conjunction with PREMOD for 15 minutes         Patient Education and Home Exercises       Education provided:   - form correction, demonstration. Postural awareness with achieving scapular retraction without over activating upper trap with shoulder hike during exercises.     Written Home Exercises Provided: Patient instructed to cont prior HEP. Exercises were reviewed and Devon was able to demonstrate them prior to the end of the session.  Devon demonstrated good  understanding of the education provided. See EMR under Patient Instructions for exercises provided during therapy sessions    Assessment        Patient continues to have pain in the Back, Right Shoulder, and the Right foot due to plantar fascitis. Reports the pain today is more so soreness on the right shoulder.  We addressed this today with exercises focusing on scapular stabilization and  strengthening.  Most of the treatment has centered around the mid-lower Back and core today.   He works hard in Therapy and tolerates all the treatments well. Patient gets the most relief from the Pre-Mod and Moist Heat therefore we ended the session with this across the lower back and the right shoulder. We will continue to progress the patient as able.       PMH from evaluation:  Devon is a 52 y.o. male referred to outpatient Physical Therapy with a medical diagnosis of Right Shoulder, Upper Back, and Low Back Pain.   Devon reports: He came to Physical Therapy originally in June but has been unable to attend Therapy due to work restrictions and being out of state for training. He is back today to begin Therapy. Most of his pain now is Right Shoulder, Upper Back, and Low Back. He does have intermittent Left Knee pain and intermittent plantar fascitis. The Right foot is painful now due to a flare up of plantar fascitis.   Patient is normally very active and was able to run 3 miles each year for the [employer] physical with no difficulty. At this time patient is unable to run due to pain in multiple joints and the spine. [facility] and [facility] Physical Therapy. Re-Evaluation being done today to get a new baseline for all areas of pain and dysfunction.   Shoulder : Patient has decreased strength and Range of Motion in the Right Shoulder. Patient has pain in Right Shoulder and Upper Back. Patient was negative on the Special Tests for all Rotator Cuff testing, but he was positive for the Impingement tests. Most shoulder pain appears to be arthritic in nature. Resistance of the Right shoulder causes strain/pain in the Upper back. Most pain centered around the scapular area, rhomboids, and Upper Traps.   Back : Patient has pain in the lumbar region but was not positive for straight leg raise or slump test. He does have decreased lumbosacral mobility and decreased  strength in Core and Lower Extremity.   Hip : Right Hip has tenderness over the Trochanteric Bursa  Knee : Patient has intermittent pain in the Left Knee but none at this time. Has history of patellar tendinitis. Special tests were negative and ligaments appear intact.   Ankles : Patient has history of plantar fascitis bilaterally. At time of Re-Evaluation he is having symptoms of Right Plantar Fascitis. Dorsiflexion Range of Motion is 10 on the Right and 15 on the Left. All other ankle Range of Motion is Within Normal Limits.   Patient will require Physical Therapy Intervention to address all deficits and work toward completion of all goals set. Therapist will refer patient back to MD upon completion of Therapy for further assessment and possible MRI if pain and dysfunction persist.        Devon Is progressing well towards his goals.   Pt prognosis is Good.     Pt will continue to benefit from skilled outpatient physical therapy to address the deficits listed in the problem list box on initial evaluation, provide pt/family education and to maximize pt's level of independence in the home and community environment.     Pt's spiritual, cultural and educational needs considered and pt agreeable to plan of care and goals.     Anticipated barriers to physical therapy: none       Goals:  Short Term Goals: 4 weeks   1. Patient will be Independent with Home Exercise Program   2. Patient will demonstrate with improved Posture and Body Mechanics  3. Patient will increase Lumbosacral Range of Motion by 25%   4. Patient will increase Lower Extremity and Core Strength to grossly 4+/5  5. Patient will increase Right Scapular strength from 4/5 to 5/5   6. Patient will have no complaints of Left Knee pain with Exercises   7. Patient will decrease complaints of pain with activity to Less than or Equal to 5/10      Long Term Goals: 8 weeks   1. Patient will tolerate 30 minutes of activity with complaints of Pain at Less Than or  Equal to 2/10 overall    2. Patient will increase lumbosacral Range of Motion to Within Normal Limits   3. Patient will increase Right Shoulder Range of Motion to Within Normal Limits with no pain at end range   4. Patient will increase Dorsiflexion Range of Motion to 20 degrees bilaterally        Plan     Plan of care Certification: 8/16/2023 to 10/13/2023.     Outpatient Physical Therapy 2 times weekly for 8 weeks to include the following interventions: Cervical/Lumbar Traction, Electrical Stimulation to decrease pain and inflammation as needed, Manual Therapy, Moist Heat/ Ice, Neuromuscular Re-ed, Patient Education, Therapeutic Activities, and Therapeutic Exercise.      Misha Victoria, PTA   9/20/2023

## 2023-09-26 ENCOUNTER — CLINICAL SUPPORT (OUTPATIENT)
Dept: REHABILITATION | Facility: HOSPITAL | Age: 53
End: 2023-09-26
Payer: OTHER GOVERNMENT

## 2023-09-26 DIAGNOSIS — G89.29 CHRONIC BILATERAL LOW BACK PAIN WITHOUT SCIATICA: Primary | ICD-10-CM

## 2023-09-26 DIAGNOSIS — M54.50 CHRONIC BILATERAL LOW BACK PAIN WITHOUT SCIATICA: Primary | ICD-10-CM

## 2023-09-26 PROCEDURE — 97110 THERAPEUTIC EXERCISES: CPT | Mod: CQ

## 2023-09-26 PROCEDURE — 97112 NEUROMUSCULAR REEDUCATION: CPT | Mod: CQ

## 2023-09-26 PROCEDURE — 97014 ELECTRIC STIMULATION THERAPY: CPT | Mod: CQ

## 2023-09-26 NOTE — PROGRESS NOTES
[facility]   Physical Therapy Treatment Note      Name: Devon Chapa  Clinic Number: [id number]  Therapy Diagnosis: Right Shoulder, Upper Back, and Low Back Pain   Physician: Trisha Montilla*  Visit Date: 9/26/2023     Physician Orders: PT Eval and Treat   Medical Diagnosis from Referral:  Right Shoulder, Upper Back, and Low Back Pain   Evaluation Date: 6/6/2023  Re-Evaluation Date : 8/16/2023  Authorization Period Expiration: 10/4/2023  Plan of Care Expiration: 10/13/2023 (8 weeks)  Visit # / Visits authorized: 12 / 15   PTA Visit #: 2/5     FOTO: 60/100     Precautions: Standard      Time In: 3:58 pm   Time Out: 4:57 pm   Total Appointment Time (timed & untimed codes): 59 minutes    Subjective     Pt reports: he is feeling ok today. Shoulder and back is feeling a little better.     He was compliant with home exercise program.  Response to previous treatment: felt ok  Functional change: n/a    Pain: 2-3/10 (back); 4/10 Right shoulder  Location: bilateral back  and right shoulder    Objective      Objective Measures updated at progress report unless specified.     Treatment     Devon received the treatments listed below:      therapeutic exercises to develop strength, endurance, ROM, flexibility, posture, and core stabilization for 24 minutes including:    Low Back and Lower Extremity Exercises     Back Exercises    Bike  5 minutes    Calf Stretch Gastroc and soleus 3 x 20 second hold        Seated ball rollouts  Fwd, left, and right 5 x 5 second hold each   Hamstring Stretch 2 x 20 second hold on step (bilateral)       Cybex Back 5 plates 3 x 10   Cybex Abdominals    Cybex Leg Press -  Bilateral  10 plates 3 x 10    Cybex hamstring curls  7 plates 3 x 10   Cybex Hip - Abduction (bilateral) 3 x 10 2 plates    Cybex Hip - Flexion    Cybex Hip - Extension (bilateral) 3 x 10 2 plates                                                manual therapy techniques:  were applied to  the: - for - minutes, including:    neuromuscular re-education activities to improve: Coordination, Kinesthetic, Sense, and Posture for 15 minutes. The following activities were included:    Cybex Rows- Close 6 plates x 30   Cybex Rows - Wide 6 plates x 30     Shoulder Int Rot and Ext Rot    Shoulder Flex and Abduction    Bilateral Shoulder Ext/Scap Retraction 3 x 10 blue Theraband handles   Bilateral Horizontal Abduction with scapular retraction green theraband handles 3 x 10   Cable Rotation  #10 x 20 each (B)       therapeutic activities to improve functional performance for -  minutes, including:    Patient received the following supervised modalities after being cleared for contradictions: Pre-Mod Electrical Stimulation:  Patient received Pre-Mod Electrical Stimulation for pain control applied to the lumbar region and right shoulder Pt received stimulation at a frequency of  Hz for 20 minutes. Patient tolerated treatment well without any adverse effects.  MHP in conjunction with PREMOD for 20 minutes       Patient Education and Home Exercises       Education provided:   - form correction, demonstration. Postural awareness with achieving scapular retraction without over activating upper trap with shoulder hike during exercises.     Written Home Exercises Provided: Patient instructed to cont prior HEP. Exercises were reviewed and Devon was able to demonstrate them prior to the end of the session.  Devon demonstrated good  understanding of the education provided. See EMR under Patient Instructions for exercises provided during therapy sessions    Assessment     Patient reports his back and shoulder are feeling some better today versus previous session. We continued with focus on scapular stabilization exercises to progress strengthening and emphasizing good postural form to achieve better alignment of the spine.   Most of our treatments have been centered around the mid-lower Back and core.   He  works hard in Therapy and tolerates all the treatments well. Patient gets the most relief from the Pre-Mod and Moist Heat therefore we ended the session with this across the lower back today. We will continue to progress the patient as able when he returns.       PMH from evaluation:  Devon is a 52 y.o. male referred to outpatient Physical Therapy with a medical diagnosis of Right Shoulder, Upper Back, and Low Back Pain.   Devon reports: He came to Physical Therapy originally in June but has been unable to attend Therapy due to work restrictions and being out of state for training. He is back today to begin Therapy. Most of his pain now is Right Shoulder, Upper Back, and Low Back. He does have intermittent Left Knee pain and intermittent plantar fascitis. The Right foot is painful now due to a flare up of plantar fascitis.   Patient is normally very active and was able to run 3 miles each year for the [employer] physical with no difficulty. At this time patient is unable to run due to pain in multiple joints and the spine. [facility] and [facility] patient to receive [facility] Physical Therapy. Re-Evaluation being done today to get a new baseline for all areas of pain and dysfunction.   Shoulder : Patient has decreased strength and Range of Motion in the Right Shoulder. Patient has pain in Right Shoulder and Upper Back. Patient was negative on the Special Tests for all Rotator Cuff testing, but he was positive for the Impingement tests. Most shoulder pain appears to be arthritic in nature. Resistance of the Right shoulder causes strain/pain in the Upper back. Most pain centered around the scapular area, rhomboids, and Upper Traps.   Back : Patient has pain in the lumbar region but was not positive for straight leg raise or slump test. He does have decreased lumbosacral mobility and decreased strength in Core and Lower Extremity.   Hip : Right Hip has tenderness over the Trochanteric  Bursa  Knee : Patient has intermittent pain in the Left Knee but none at this time. Has history of patellar tendinitis. Special tests were negative and ligaments appear intact.   Ankles : Patient has history of plantar fascitis bilaterally. At time of Re-Evaluation he is having symptoms of Right Plantar Fascitis. Dorsiflexion Range of Motion is 10 on the Right and 15 on the Left. All other ankle Range of Motion is Within Normal Limits.   Patient will require Physical Therapy Intervention to address all deficits and work toward completion of all goals set. Therapist will refer patient back to MD upon completion of Therapy for further assessment and possible MRI if pain and dysfunction persist.        Devon Is progressing well towards his goals.   Pt prognosis is Good.     Pt will continue to benefit from skilled outpatient physical therapy to address the deficits listed in the problem list box on initial evaluation, provide pt/family education and to maximize pt's level of independence in the home and community environment.     Pt's spiritual, cultural and educational needs considered and pt agreeable to plan of care and goals.     Anticipated barriers to physical therapy: none       Goals:  Short Term Goals: 4 weeks   1. Patient will be Independent with Home Exercise Program   2. Patient will demonstrate with improved Posture and Body Mechanics  3. Patient will increase Lumbosacral Range of Motion by 25%   4. Patient will increase Lower Extremity and Core Strength to grossly 4+/5  5. Patient will increase Right Scapular strength from 4/5 to 5/5   6. Patient will have no complaints of Left Knee pain with Exercises   7. Patient will decrease complaints of pain with activity to Less than or Equal to 5/10      Long Term Goals: 8 weeks   1. Patient will tolerate 30 minutes of activity with complaints of Pain at Less Than or Equal to 2/10 overall    2. Patient will increase lumbosacral Range of Motion to Within  Normal Limits   3. Patient will increase Right Shoulder Range of Motion to Within Normal Limits with no pain at end range   4. Patient will increase Dorsiflexion Range of Motion to 20 degrees bilaterally        Plan     Plan of care Certification: 8/16/2023 to 10/13/2023.     Outpatient Physical Therapy 2 times weekly for 8 weeks to include the following interventions: Cervical/Lumbar Traction, Electrical Stimulation to decrease pain and inflammation as needed, Manual Therapy, Moist Heat/ Ice, Neuromuscular Re-ed, Patient Education, Therapeutic Activities, and Therapeutic Exercise.      Misha Victoria, PTA   9/26/2023

## 2023-10-02 ENCOUNTER — CLINICAL SUPPORT (OUTPATIENT)
Dept: REHABILITATION | Facility: HOSPITAL | Age: 53
End: 2023-10-02
Payer: OTHER GOVERNMENT

## 2023-10-02 DIAGNOSIS — M25.511 ACUTE PAIN OF RIGHT SHOULDER: ICD-10-CM

## 2023-10-02 DIAGNOSIS — G89.29 CHRONIC BILATERAL LOW BACK PAIN WITHOUT SCIATICA: ICD-10-CM

## 2023-10-02 DIAGNOSIS — M54.9 UPPER BACK PAIN: Primary | ICD-10-CM

## 2023-10-02 DIAGNOSIS — M54.50 CHRONIC BILATERAL LOW BACK PAIN WITHOUT SCIATICA: ICD-10-CM

## 2023-10-02 PROCEDURE — 97112 NEUROMUSCULAR REEDUCATION: CPT | Mod: CQ

## 2023-10-02 PROCEDURE — 97110 THERAPEUTIC EXERCISES: CPT | Mod: CQ

## 2023-10-02 PROCEDURE — 97014 ELECTRIC STIMULATION THERAPY: CPT | Mod: CQ

## 2023-10-02 NOTE — PROGRESS NOTES
[facility]   Physical Therapy Treatment Note      Name: Devon Chapa  Clinic Number: [id number]  Therapy Diagnosis: Right Shoulder, Upper Back, and Low Back Pain   Physician: Trisha Montilla*  Visit Date: 10/2/2023     Physician Orders: PT Eval and Treat   Medical Diagnosis from Referral:  Right Shoulder, Upper Back, and Low Back Pain   Evaluation Date: 6/6/2023  Re-Evaluation Date : 8/16/2023  Authorization Period Expiration: 10/4/2023  Plan of Care Expiration: 10/13/2023 (8 weeks)  Visit # / Visits authorized: 13 / 15   PTA Visit #: 3/5     FOTO: 60/100     Precautions: Standard      Time In: 4:00 pm   Time Out: 4:48 pm   Total Appointment Time (timed & untimed codes): 48 minutes    Subjective     Pt reports: he has felt okay, is doing well. A little soreness last visit noted. Reports the lower back is feeling a little stronger.     He was compliant with home exercise program.  Response to previous treatment: felt ok  Functional change: n/a    Pain: 2-3/10 (back); 4/10 Right shoulder  Location: bilateral back  and right shoulder    Objective      Objective Measures updated at progress report unless specified.     Treatment     Devon received the treatments listed below:      therapeutic exercises to develop strength, endurance, ROM, flexibility, posture, and core stabilization for 26 minutes including:    Low Back and Lower Extremity Exercises     Back Exercises    elliptical  5 minutes    Calf Stretch Gastroc and soleus 3 x 20 second hold        Seated ball rollouts  Fwd, left, and right 5 x 5 second hold each   Hamstring Stretch 2 x 20 second hold on step (bilateral)       Cybex Back 5 plates 4 x 10   Cybex Abdominals    Cybex Leg Press -  Bilateral  10 plates 3 x 10    Cybex hamstring curls  5 plates 3 x 10   Cybex Hip - Abduction (bilateral) 3 x 10 2 plates   (need to increase weight at next visit on 10/4)   Cybex Hip - Flexion    Cybex Hip - Extension (bilateral)  3 x 10 2 plates  (need to increase weight at next visit on 10/4)                                               manual therapy techniques:  were applied to the: - for - minutes, including:    neuromuscular re-education activities to improve: Coordination, Kinesthetic, Sense, and Posture for 12 minutes. The following activities were included:    Cybex Rows- Close 6 plates x 30   Cybex Rows - Wide 6 plates x 30     Shoulder Int Rot and Ext Rot    Shoulder Flex and Abduction    Bilateral Shoulder Ext/Scap Retraction 3 x 10 blue Theraband handles   Bilateral Horizontal Abduction with scapular retraction green theraband handles 3 x 10   Cable Rotation  #10 x 20 each (B)       therapeutic activities to improve functional performance for -  minutes, including:    Patient received the following supervised modalities after being cleared for contradictions: Pre-Mod Electrical Stimulation:  Patient received Pre-Mod Electrical Stimulation for pain control applied to the lumbar region and right shoulder Pt received stimulation at a frequency of  Hz for 10 minutes. Patient tolerated treatment well without any adverse effects.  MHP in conjunction with PREMOD for 10 minutes       Patient Education and Home Exercises       Education provided:   - form correction, demonstration. Postural awareness with achieving scapular retraction without over activating upper trap with shoulder hike during exercises.     Written Home Exercises Provided: Patient instructed to cont prior HEP. Exercises were reviewed and Devon was able to demonstrate them prior to the end of the session.  Devon demonstrated good  understanding of the education provided. See EMR under Patient Instructions for exercises provided during therapy sessions    Assessment     Patient reports his back and shoulder are feeling better today. He did have some soreness from the last visit.  We continued with focus on scapular stabilization exercises to progress  strengthening and emphasizing good postural form. Still including strengthening of the core and mid-lower back to better support the spine and promote better postural alignment. Most of our treatments have been centered around the mid-lower Back and core as well as the lower extremity.  He works hard in Therapy and tolerates all the treatments well. Patient gets the most relief from the Pre-Mod and Moist Heat therefore we ended the session with this across the lower back today. We will continue to progress the patient as able when he returns.       PMH from evaluation:  Devon is a 52 y.o. male referred to outpatient Physical Therapy with a medical diagnosis of Right Shoulder, Upper Back, and Low Back Pain.   Devon reports: He came to Physical Therapy originally in June but has been unable to attend Therapy due to work restrictions and being out of state for training. He is back today to begin Therapy. Most of his pain now is Right Shoulder, Upper Back, and Low Back. He does have intermittent Left Knee pain and intermittent plantar fascitis. The Right foot is painful now due to a flare up of plantar fascitis.   Patient is normally very active and was able to run 3 miles each year for the [employer] physical with no difficulty. At this time patient is unable to run due to pain in multiple joints and the spine. [facility] and [facility] wants patient to receive Out[facility] Physical Therapy. Re-Evaluation being done today to get a new baseline for all areas of pain and dysfunction.   Shoulder : Patient has decreased strength and Range of Motion in the Right Shoulder. Patient has pain in Right Shoulder and Upper Back. Patient was negative on the Special Tests for all Rotator Cuff testing, but he was positive for the Impingement tests. Most shoulder pain appears to be arthritic in nature. Resistance of the Right shoulder causes strain/pain in the Upper back. Most pain centered around the scapular area,  rhomboids, and Upper Traps.   Back : Patient has pain in the lumbar region but was not positive for straight leg raise or slump test. He does have decreased lumbosacral mobility and decreased strength in Core and Lower Extremity.   Hip : Right Hip has tenderness over the Trochanteric Bursa  Knee : Patient has intermittent pain in the Left Knee but none at this time. Has history of patellar tendinitis. Special tests were negative and ligaments appear intact.   Ankles : Patient has history of plantar fascitis bilaterally. At time of Re-Evaluation he is having symptoms of Right Plantar Fascitis. Dorsiflexion Range of Motion is 10 on the Right and 15 on the Left. All other ankle Range of Motion is Within Normal Limits.   Patient will require Physical Therapy Intervention to address all deficits and work toward completion of all goals set. Therapist will refer patient back to MD upon completion of Therapy for further assessment and possible MRI if pain and dysfunction persist.        Devon Is progressing well towards his goals.   Pt prognosis is Good.     Pt will continue to benefit from skilled outpatient physical therapy to address the deficits listed in the problem list box on initial evaluation, provide pt/family education and to maximize pt's level of independence in the home and community environment.     Pt's spiritual, cultural and educational needs considered and pt agreeable to plan of care and goals.     Anticipated barriers to physical therapy: none       Goals:  Short Term Goals: 4 weeks   1. Patient will be Independent with Home Exercise Program   2. Patient will demonstrate with improved Posture and Body Mechanics  3. Patient will increase Lumbosacral Range of Motion by 25%   4. Patient will increase Lower Extremity and Core Strength to grossly 4+/5  5. Patient will increase Right Scapular strength from 4/5 to 5/5   6. Patient will have no complaints of Left Knee pain with Exercises   7. Patient will  decrease complaints of pain with activity to Less than or Equal to 5/10      Long Term Goals: 8 weeks   1. Patient will tolerate 30 minutes of activity with complaints of Pain at Less Than or Equal to 2/10 overall    2. Patient will increase lumbosacral Range of Motion to Within Normal Limits   3. Patient will increase Right Shoulder Range of Motion to Within Normal Limits with no pain at end range   4. Patient will increase Dorsiflexion Range of Motion to 20 degrees bilaterally        Plan     Plan of care Certification: 8/16/2023 to 10/13/2023.     Outpatient Physical Therapy 2 times weekly for 8 weeks to include the following interventions: Cervical/Lumbar Traction, Electrical Stimulation to decrease pain and inflammation as needed, Manual Therapy, Moist Heat/ Ice, Neuromuscular Re-ed, Patient Education, Therapeutic Activities, and Therapeutic Exercise.      Misha Victoria, PTA   10/2/2023

## 2023-12-12 DIAGNOSIS — M25.519 PAIN IN UNSPECIFIED SHOULDER: Primary | ICD-10-CM

## 2024-01-03 ENCOUNTER — CLINICAL SUPPORT (OUTPATIENT)
Dept: REHABILITATION | Facility: HOSPITAL | Age: 54
End: 2024-01-03
Payer: OTHER GOVERNMENT

## 2024-01-03 DIAGNOSIS — R29.898 WEAKNESS OF BACK: ICD-10-CM

## 2024-01-03 DIAGNOSIS — M54.41 CHRONIC BILATERAL LOW BACK PAIN WITH BILATERAL SCIATICA: Primary | ICD-10-CM

## 2024-01-03 DIAGNOSIS — G89.29 CHRONIC RIGHT SHOULDER PAIN: ICD-10-CM

## 2024-01-03 DIAGNOSIS — M25.519 PAIN IN UNSPECIFIED SHOULDER: ICD-10-CM

## 2024-01-03 DIAGNOSIS — R29.898 RIGHT ARM WEAKNESS: ICD-10-CM

## 2024-01-03 DIAGNOSIS — M54.42 CHRONIC BILATERAL LOW BACK PAIN WITH BILATERAL SCIATICA: Primary | ICD-10-CM

## 2024-01-03 DIAGNOSIS — G89.29 CHRONIC BILATERAL LOW BACK PAIN WITH BILATERAL SCIATICA: Primary | ICD-10-CM

## 2024-01-03 DIAGNOSIS — M25.511 CHRONIC RIGHT SHOULDER PAIN: ICD-10-CM

## 2024-01-03 PROCEDURE — 97110 THERAPEUTIC EXERCISES: CPT

## 2024-01-03 PROCEDURE — 97162 PT EVAL MOD COMPLEX 30 MIN: CPT

## 2024-01-03 NOTE — PLAN OF CARE
[facility]   Physical Therapy Initial Evaluation      Name: Devon Chapa  Clinic Number: [id number]    Therapy Diagnosis: Right Shoulder and Low Back Pain  Physician: Trihsa Montilla*    Physician Orders: PT Eval and Treat   Medical Diagnosis from Referral: Right Shoulder and Low Back Pain  Evaluation Date: 1/3/2024  Authorization Period Expiration: 12/11/2024   Plan of Care Expiration: 3/1/2024   Progress Note Due: As needed   Visit # / Visits authorized: 1/ 15   FOTO: 55/100    Precautions: Standard     Time In: 4:43 pm   Time Out: 5:30 pm   Total Appointment Time (timed & untimed codes): 45 minutes    Subjective     Date of onset: 12/1/2023    History of current condition - Devon reports:  he has pain in the low back and bilateral hips with prolonged sitting. He reports he has stiffness in the back and core weakness. He states that the low back pain has worsened since he was last in therapy and he just feels tight and painful all the time.  He also still has pain in the Right shoulder. His main concern today is the low back pain and weakness.     Falls: No     Imaging:   Shoulder X Ray   FINDINGS:  Lumbar vertebral body heights and alignment appear maintained.  Mild scattered posterior facet arthropathy.  Mild loss of disc space height noted at several levels with minimal marginal vertebral body osteophyte formation.  Lumbar vertebral body heights and alignment appear maintained.  Impression:  Mild degenerative change of the lumbar spine.     Back X Ray   FINDINGS:  Mild degenerative change of the bilateral acromioclavicular joints.  No convincing acute fracture or dislocation demonstrated. No concerning radiopaque foreign body visualized.     Prior Therapy: None this year   Social History:  lives with their family  Occupation: In the  - does office work. Did work as a  in the  for years   Prior Level of Function: In 2020 he was running 3  "miles at least twice a week  Current Level of Function: Wants to return to running and working in the yard.      Pain:  Current 3/10, worst 7/10, best 2/10   Location: right shoulder and Low Back  Description: Aching, Dull, Sharp, and Tight  Aggravating Factors: Right arm movements aggravate the Right shoulder; Prolonged sitting and lifting cause low back pain  Easing Factors: relaxation, pain medication, lying down, and rest     Patients goals: "I want to get back to fishing and doing yard work without having to take pain medicine."     Medical History:   No past medical history on file.     Surgical History:   Devon Chapa  has a past surgical history that includes Splenectomy, total.     Medications:   Devon has a current medication list which includes the following prescription(s): cyclobenzaprine, omeprazole, and sertraline.     Allergies:   Review of patient's allergies indicates:  No Known Allergies      Objective          Posture :      Standing Lordosis        []  Increased   [x]   Decreased   []  Within Normal Limits  Sitting Lordosis            []  Increased   [x]   Decreased   []  Within Normal Limits   Iliac Crest Height         []  Left/Right Increased      [x] Equal/Level  PSIS Height                 []  Left/Right Increased      [x] Equal/Level  Pelvic Rot/Torsion       []   Yes     [x]   No  Scoliosis                       [x]  Yes   Concave Right     []  No (Mild Thoracic Scoliosis)   Lateral Shift                  []   Right     []   Left          [x]  Within Normal Limits     Strength :       Myotome/Muscle :  Left   Right     L1-2    Psoas  4+/5  4+/5    L3       Quadriceps  5/5  5/5    L4       Anterior Tibialis  5/5   5/5    L5       EHL   5/5  5/5    S1      Gastocnemius  5/5  5/5     S2      Hamstrings  4+/5  4+/5                              Strength :   Abdominals 3+/5  Back Extensors  3+/5  Multifidus 3/5        Range of Motion :      Back :               Forward Flexion 80 " degrees               Back Bending 20 degrees               Side Bending Left 30 degrees (Painful)              Side Bending Right 40 degrees               Rotation Left - Normal               Rotation Right  Normal (P!)     Hip :   Hamstrings : Tight Bilateral   Piriformis :  Tight Bilateral         Special Tests :      SLR :   Right   +/- <60 Degrees     []   yes   [x]  No  ;   +/-  60-90 Degrees     []   Yes    [x]  No              Left     +/- <60 Degrees      []  yes    [x] No ;   +/-  60-90 Degrees       []   Yes    [x] No     Sitting Slump Test :  Left : [] Positive   [x]  Negative ;  Right : [] Positive   [x]  Negative   Lower Extremity Length :   [] Right/Left Longer     [x]  Within Normal Limits   RAMON : Left : [] Positive   [x]  Negative ;  Right : [x] Positive   []  Negative (Right Piriformis is tight and slightly painful with palpation)        Right Shoulder : Patient was negative for all Rotator Cuff testing. Most shoulder pain appears to be arthritic in nature. Resistance of the Right shoulder causes strain/pain in the Upper back. Most pain centered around the scapular area, rhomboids, and Upper Traps.         Limitation/Restriction for FOTO Intake Shoulder Survey     Therapist reviewed FOTO scores for Devon Chapa on 6/6/2023.   FOTO documents entered into [employer] - see Media section.     Limitation Score: 45%         Patient Education and Home Exercises     Education provided:   - Discussed the findings from the Evaluation, Reviewed the Plan of Care, and Instructed patient on their Home Exercise Program      Written Home Exercises Provided: yes. Exercises were reviewed and Devon was able to demonstrate them prior to the end of the session.  Devon demonstrated good  understanding of the education provided. See EMR under Patient Instructions for exercises provided during therapy sessions.        Assessment      Devon is a 52 y.o. male referred to outpatient Physical Therapy with a  medical diagnosis of Right Shoulder and Low Back Pain. maxine reports:  he has pain in the low back and bilateral hips with prolonged sitting. He reports he has stiffness in the back and core weakness. He states that the low back pain has worsened since he was last in therapy and he just feels tight and painful all the time.  He also still has pain in the Right shoulder. His main concern today is the low back pain and weakness. Patient reports he is usually very active and enjoys running and working in his yard. He has been unable to do this for some time now.  Patient will benefit from skilled Physical Therapy intervention to address all deficits and help patient to return to their prior level of function.       Patient prognosis is Good.   Patient will benefit from skilled outpatient Physical Therapy to address the deficits stated above and in the chart below, provide patient /family education, and to maximize patientt's level of independence.      Plan of care discussed with patient: Yes  Patient's spiritual, cultural and educational needs considered and patient is agreeable to the plan of care and goals as stated below:      Anticipated Barriers for therapy: None     Medical Necessity is demonstrated by the following  History  Co-morbidities and personal factors that may impact the plan of care [] LOW: no personal factors / co-morbidities  [] MODERATE: 1-2 personal factors / co-morbidities  [x] HIGH: 3+ personal factors / co-morbidities     Moderate / High Support Documentation:   Co-morbidities affecting plan of care: Spleen removal; Degenerative changes in the spine; patellar tendonitis, diabetes, scoliosis     Personal Factors:   no deficits      Examination  Body Structures and Functions, activity limitations and participation restrictions that may impact the plan of care [] LOW: addressing 1-2 elements  [] MODERATE: 3+ elements  [x] HIGH: 4+ elements (please support below)     Moderate / High Support  Documentation: Right shoulder, Upper Back, Low Back, and Left Knee      Clinical Presentation [] LOW: stable  [x] MODERATE: Evolving  [] HIGH: Unstable      Decision Making/ Complexity Score: moderate            Goals:  Short Term Goals: 4 weeks   1. Patient will be Independent with Home Exercise Program   2. Patient will demonstrate with improved Posture and Body Mechanics  3. Patient will increase Lumbosacral Range of Motion by 25%   4. Patient will increase Lower Extremity and Core Strength to grossly 4+/5  5. Patient will increase Right Scapular strength from 4/5 to 5/5   6. Patient will decrease complaints of pain with activity to Less than or Equal to 5/10      Long Term Goals: 8 weeks   1. Patient will tolerate 30 minutes of activity with complaints of Pain at Less Than or Equal to 4/10       Plan      Plan of care Certification: 1/3/2024 to 3/1/2024.     Outpatient Physical Therapy 2 times weekly for 8 weeks to include the following interventions: Cervical/Lumbar Traction, Electrical Stimulation to decrease pain and inflammation as needed, Manual Therapy, Moist Heat/ Ice, Neuromuscular Re-ed, Patient Education, Therapeutic Activities, and Therapeutic Exercise.        WILFRIDO MONTAÑO, PT, DPT

## 2024-01-03 NOTE — PROGRESS NOTES
See Plan of Care     Sup Visit performed today with KODY Rosales and KODY Cates.  All goals and treatment plan reviewed. Will work toward completion of all goals set.     First Treatment May Include :     Instruct him on the back Home Exercise Program and advance him to Cybex machines when able      Back Exercises    Bike/NuStep                 Calf Stretch    Hamstring Stretch    Pelvic Tilts    Bridging    Bridging with Abduction    Bridging/Hooklying with Marching    Bridging/Hooklying with Knee Ext    Trunk Rotation Exercise    Trunk Rotation Stretch    Ball - Trunk Rotation    Ball- Knee Extension    Planks    Quadraped

## 2024-01-16 ENCOUNTER — CLINICAL SUPPORT (OUTPATIENT)
Dept: REHABILITATION | Facility: HOSPITAL | Age: 54
End: 2024-01-16
Payer: OTHER GOVERNMENT

## 2024-01-16 DIAGNOSIS — M54.41 CHRONIC BILATERAL LOW BACK PAIN WITH BILATERAL SCIATICA: Primary | ICD-10-CM

## 2024-01-16 DIAGNOSIS — R29.898 RIGHT ARM WEAKNESS: ICD-10-CM

## 2024-01-16 DIAGNOSIS — M54.42 CHRONIC BILATERAL LOW BACK PAIN WITH BILATERAL SCIATICA: Primary | ICD-10-CM

## 2024-01-16 DIAGNOSIS — R29.898 WEAKNESS OF BACK: ICD-10-CM

## 2024-01-16 DIAGNOSIS — G89.29 CHRONIC RIGHT SHOULDER PAIN: ICD-10-CM

## 2024-01-16 DIAGNOSIS — M25.511 CHRONIC RIGHT SHOULDER PAIN: ICD-10-CM

## 2024-01-16 DIAGNOSIS — G89.29 CHRONIC BILATERAL LOW BACK PAIN WITH BILATERAL SCIATICA: Primary | ICD-10-CM

## 2024-01-16 PROCEDURE — 97014 ELECTRIC STIMULATION THERAPY: CPT | Mod: CQ

## 2024-01-16 PROCEDURE — 97110 THERAPEUTIC EXERCISES: CPT | Mod: CQ

## 2024-01-16 PROCEDURE — 97112 NEUROMUSCULAR REEDUCATION: CPT | Mod: CQ

## 2024-01-16 NOTE — PROGRESS NOTES
[facility]   Physical Therapy Treatment Note      Name: Devon Chapa  Clinic Number: [id number]    Therapy Diagnosis:   Encounter Diagnoses   Name Primary?    Chronic bilateral low back pain with bilateral sciatica Yes    Chronic right shoulder pain     Weakness of back     Right arm weakness      Physician: Trisha Montilla*    Visit Date: 1/16/2024    Therapy Diagnosis: Right Shoulder and Low Back Pain  Physician: Trisha Montilla*     Physician Orders: PT Eval and Treat   Medical Diagnosis from Referral: Right Shoulder and Low Back Pain  Evaluation Date: 1/3/2024  Authorization Period Expiration: 12/11/2024   Plan of Care Expiration: 3/1/2024   Progress Note Due: As needed   Visit # / Visits authorized: 2 / 15  PTA Visit #: 1/5      FOTO: 55/100     Precautions: Standard      Time In: 2:32 pm   Time Out: 3:23 pm   Total Appointment Time (timed & untimed codes): 45 minutes    Subjective     Patient reports: his lower back is hurting today.  He was compliant with home exercise program.  Response to previous treatment: first visit since evaluation   Functional change: n/a    Pain: 3/10  Location: bilateral back      Objective      Objective Measures updated at progress report unless specified.     Treatment     Devon received the treatments listed below:      therapeutic exercises to develop strength, endurance, ROM, flexibility, posture, and core stabilization for 20 minutes including:.    Back Exercises    Bike/NuStep/elliptical Elliptical x 5 minutes                 Calf Stretch 3 x 20 second hold    Seated ball rollouts  5 x 5 second hold fwd/left/right   Seated piriformis stretch  2 x 10 second hold (B)   Hamstring Stretch 2 x 20 second hold (B)   SKTC  (using towel) 2 x 20 second hold (B)                   Bridging/Hooklying with Knee Ext    Trunk Rotation Exercise X 20   Trunk Rotation Stretch 5 x 15 second hold (B)   Ball - Trunk Rotation    Ball- Knee  Extension    Planks    Quadraped           manual therapy techniques: - were applied to the: - for - minutes, including:      neuromuscular re-education activities to improve: Coordination, Kinesthetic, Sense, Proprioception, and Posture for 10 minutes. The following activities were included:  Pelvic Tilts X 20   Bridging X 20   Bridging with Abduction X 20 (blue band)   Bridging/Hooklying with Marching X 20 (blue band)   Bilateral extension with retraction X 20 (green band)     therapeutic activities to improve functional performance for -  minutes, including:    Patient received the following supervised modalities after being cleared for contradictions: Pre-Mod Electrical Stimulation:  Patient received Pre-Mod Electrical Stimulation for pain control applied to the lower lumbar region. Pt received stimulation at a frequency of  Hz for 15 minutes. Patient tolerated treatment well without any adverse effects.  hot pack for 15 minutes to lower lumbar region in conjunction with premod estim.    Patient Education and Home Exercises       Education provided:   - basic back home exercise program handout given today (1/16/2024). Issued blue Theraband to use with hooklying mat exercises. Patient was able to return demonstration of proper form and technique by return demonstration.     Written Home Exercises Provided: yes. Exercises were reviewed and Devon was able to demonstrate them prior to the end of the session.  Devon demonstrated good  understanding of the education provided. See Electronic Medical Record under Patient Instructions for exercises provided during therapy sessions    Assessment     Supervisory visit with WILFRIDO MONTAÑO PT, DPT  prior to initial PTA visit.     Today is the patient's first treatment since the Evaluation. Initiated the Plan of Care and instructed patient on proper form for all exercises. Therapist initiated the Basic Back Mat Home Exercise Program today. Verbal and  tactile cues provided for proper form for all the exercises. He was able to return demonstration well. Patient was given a written copy of the Home Exercise Program that included pictures and written instructions as well as blue Theraband. We will review these with him again at the next visit to ensure he is able to perform these Independently and correctly if needed. Patient reports that the exercises felt good and it did not increase his pain. Plan to progress to cybex machines at next visit.     PMH from evaluation:  Devon is a 52 y.o. male referred to outpatient Physical Therapy with a medical diagnosis of Right Shoulder and Low Back Pain. hristophfarzad reports:  he has pain in the low back and bilateral hips with prolonged sitting. He reports he has stiffness in the back and core weakness. He states that the low back pain has worsened since he was last in therapy and he just feels tight and painful all the time.  He also still has pain in the Right shoulder. His main concern today is the low back pain and weakness. Patient reports he is usually very active and enjoys running and working in his yard. He has been unable to do this for some time now.  Patient will benefit from skilled Physical Therapy intervention to address all deficits and help patient to return to their prior level of function.     Devon Is progressing well towards his goals.   Patient prognosis is Good.     Patient will continue to benefit from skilled outpatient physical therapy to address the deficits listed in the problem list box on initial evaluation, provide pt/family education and to maximize pt's level of independence in the home and community environment.     Patient's spiritual, cultural and educational needs considered and pt agreeable to plan of care and goals.     Anticipated Barriers for therapy: None    Goals:  Short Term Goals: 4 weeks   1. Patient will be Independent with Home Exercise Program   2. Patient will  demonstrate with improved Posture and Body Mechanics  3. Patient will increase Lumbosacral Range of Motion by 25%   4. Patient will increase Lower Extremity and Core Strength to grossly 4+/5  5. Patient will increase Right Scapular strength from 4/5 to 5/5   6. Patient will decrease complaints of pain with activity to Less than or Equal to 5/10      Long Term Goals: 8 weeks   1. Patient will tolerate 30 minutes of activity with complaints of Pain at Less Than or Equal to 4/10    Plan   Plan of care Certification: 1/3/2024 to 3/1/2024.     Outpatient Physical Therapy 2 times weekly for 8 weeks to include the following interventions: Cervical/Lumbar Traction, Electrical Stimulation to decrease pain and inflammation as needed, Manual Therapy, Moist Heat/ Ice, Neuromuscular Re-ed, Patient Education, Therapeutic Activities, and Therapeutic Exercise.      Misha Mora, PTA    1/16/2024

## 2024-01-23 ENCOUNTER — CLINICAL SUPPORT (OUTPATIENT)
Dept: REHABILITATION | Facility: HOSPITAL | Age: 54
End: 2024-01-23
Payer: OTHER GOVERNMENT

## 2024-01-23 DIAGNOSIS — G89.29 CHRONIC RIGHT SHOULDER PAIN: ICD-10-CM

## 2024-01-23 DIAGNOSIS — M54.42 CHRONIC BILATERAL LOW BACK PAIN WITH BILATERAL SCIATICA: Primary | ICD-10-CM

## 2024-01-23 DIAGNOSIS — R29.898 RIGHT ARM WEAKNESS: ICD-10-CM

## 2024-01-23 DIAGNOSIS — M25.511 CHRONIC RIGHT SHOULDER PAIN: ICD-10-CM

## 2024-01-23 DIAGNOSIS — G89.29 CHRONIC BILATERAL LOW BACK PAIN WITH BILATERAL SCIATICA: Primary | ICD-10-CM

## 2024-01-23 DIAGNOSIS — R29.898 WEAKNESS OF BACK: ICD-10-CM

## 2024-01-23 DIAGNOSIS — M54.41 CHRONIC BILATERAL LOW BACK PAIN WITH BILATERAL SCIATICA: Primary | ICD-10-CM

## 2024-01-23 PROCEDURE — 97112 NEUROMUSCULAR REEDUCATION: CPT | Mod: CQ

## 2024-01-23 PROCEDURE — 97014 ELECTRIC STIMULATION THERAPY: CPT | Mod: CQ

## 2024-01-23 PROCEDURE — 97110 THERAPEUTIC EXERCISES: CPT | Mod: CQ

## 2024-01-23 PROCEDURE — 97010 HOT OR COLD PACKS THERAPY: CPT | Mod: CQ

## 2024-01-23 NOTE — PROGRESS NOTES
[facility]   Physical Therapy Treatment Note      Name: Devon Chapa  Clinic Number: [id number]    Therapy Diagnosis:   No diagnosis found.    Physician: Trisha Montilla*    Visit Date: 1/23/2024    Therapy Diagnosis: Right Shoulder and Low Back Pain  Physician: Trisha Montilla*     Physician Orders: PT Eval and Treat   Medical Diagnosis from Referral: Right Shoulder and Low Back Pain  Evaluation Date: 1/3/2024  Authorization Period Expiration: 12/11/2024   Plan of Care Expiration: 3/1/2024   Progress Note Due: As needed   Visit # / Visits authorized: 3 / 15  PTA Visit #: 2/5      FOTO: 55/100     Precautions: Standard      Time In: 4:01 pm   Time Out: 5:01 pm   Total Appointment Time (timed & untimed codes): 40 billable minutes    Subjective     Patient reports: his lower back is hurting today.  He was compliant with home exercise program.  Response to previous treatment: first visit since evaluation   Functional change: n/a    Pain: 3/10  Location: bilateral back      Objective      Objective Measures updated at progress report unless specified.     Treatment     Devon received the treatments listed below:      therapeutic exercises to develop strength, endurance, ROM, flexibility, posture, and core stabilization for 13 minutes including:.    Back Exercises    Bike/NuStep/elliptical Elliptical x 5 minutes                 Calf Stretch 3 x 20 second hold    Seated ball rollouts  5 x 5 second hold fwd/left/right   Seated piriformis stretch  2 x 10 second hold (B)   Hamstring Stretch 2 x 20 second hold (B)   SKTC  (using towel) 2 x 20 second hold (B)                   Bridging/Hooklying with Knee Ext    Trunk Rotation Exercise X 20   Trunk Rotation Stretch 5 x 15 second hold (B)   Ball - Trunk Rotation    Ball- Knee Extension    Planks    Quadraped           manual therapy techniques: - were applied to the: back/hips for 2 minutes, including:  Long Axis Traction  (B)  Active HS       neuromuscular re-education activities to improve: Coordination, Kinesthetic, Sense, Proprioception, and Posture for 10 minutes. The following activities were included:  Bridging X 20   Bridging with Abduction X 20 (blue band)   Bridging/Hooklying with Marching X 20 with PPT   Bilateral extension with retraction X 20 (green band)   Hip Ext off EOB  20x (B)     therapeutic activities to improve functional performance for -  minutes, including:    Patient received the following supervised modalities after being cleared for contradictions: Pre-Mod Electrical Stimulation:  Patient received Pre-Mod Electrical Stimulation for pain control applied to the lower lumbar region. Pt received stimulation at a frequency of  Hz for 15 minutes. Patient tolerated treatment well without any adverse effects.  hot pack for 15 minutes to lower lumbar region in conjunction with premod estim.    Patient Education and Home Exercises       Education provided:   - basic back home exercise program handout given today (1/16/2024). Issued blue Theraband to use with hooklying mat exercises. Patient was able to return demonstration of proper form and technique by return demonstration.     Written Home Exercises Provided: yes. Exercises were reviewed and Devon was able to demonstrate them prior to the end of the session.  Devon demonstrated good  understanding of the education provided. See Electronic Medical Record under Patient Instructions for exercises provided during therapy sessions    Assessment     Pt is doing well with moderate tightness in low back and bilateral QL with L>R. Progressed mobility and strengthening exercises with fatigue noted. Pt tolerated session well with no adverse effects at end of session. Educated pt to keep up with HEP diligently. Will continue to progress as able. Time billed reflects time spent one on one with pt.     PMH from evaluation:  Devon is a 52 y.o. male  referred to outpatient Physical Therapy with a medical diagnosis of Right Shoulder and Low Back Pain. maxine reports:  he has pain in the low back and bilateral hips with prolonged sitting. He reports he has stiffness in the back and core weakness. He states that the low back pain has worsened since he was last in therapy and he just feels tight and painful all the time.  He also still has pain in the Right shoulder. His main concern today is the low back pain and weakness. Patient reports he is usually very active and enjoys running and working in his yard. He has been unable to do this for some time now.  Patient will benefit from skilled Physical Therapy intervention to address all deficits and help patient to return to their prior level of function.     Devon Is progressing well towards his goals.   Patient prognosis is Good.     Patient will continue to benefit from skilled outpatient physical therapy to address the deficits listed in the problem list box on initial evaluation, provide pt/family education and to maximize pt's level of independence in the home and community environment.     Patient's spiritual, cultural and educational needs considered and pt agreeable to plan of care and goals.     Anticipated Barriers for therapy: None    Goals:  Short Term Goals: 4 weeks   1. Patient will be Independent with Home Exercise Program   2. Patient will demonstrate with improved Posture and Body Mechanics  3. Patient will increase Lumbosacral Range of Motion by 25%   4. Patient will increase Lower Extremity and Core Strength to grossly 4+/5  5. Patient will increase Right Scapular strength from 4/5 to 5/5   6. Patient will decrease complaints of pain with activity to Less than or Equal to 5/10      Long Term Goals: 8 weeks   1. Patient will tolerate 30 minutes of activity with complaints of Pain at Less Than or Equal to 4/10    Plan   Plan of care Certification: 1/3/2024 to 3/1/2024.     Outpatient  Physical Therapy 2 times weekly for 8 weeks to include the following interventions: Cervical/Lumbar Traction, Electrical Stimulation to decrease pain and inflammation as needed, Manual Therapy, Moist Heat/ Ice, Neuromuscular Re-ed, Patient Education, Therapeutic Activities, and Therapeutic Exercise.      Toan Silvestre, PTA    1/23/2024

## 2024-01-30 ENCOUNTER — CLINICAL SUPPORT (OUTPATIENT)
Dept: REHABILITATION | Facility: HOSPITAL | Age: 54
End: 2024-01-30
Payer: OTHER GOVERNMENT

## 2024-01-30 DIAGNOSIS — G89.29 CHRONIC BILATERAL LOW BACK PAIN WITH BILATERAL SCIATICA: Primary | ICD-10-CM

## 2024-01-30 DIAGNOSIS — G89.29 CHRONIC RIGHT SHOULDER PAIN: ICD-10-CM

## 2024-01-30 DIAGNOSIS — R29.898 RIGHT ARM WEAKNESS: ICD-10-CM

## 2024-01-30 DIAGNOSIS — M54.42 CHRONIC BILATERAL LOW BACK PAIN WITH BILATERAL SCIATICA: Primary | ICD-10-CM

## 2024-01-30 DIAGNOSIS — R29.898 WEAKNESS OF BACK: ICD-10-CM

## 2024-01-30 DIAGNOSIS — M25.511 CHRONIC RIGHT SHOULDER PAIN: ICD-10-CM

## 2024-01-30 DIAGNOSIS — M54.41 CHRONIC BILATERAL LOW BACK PAIN WITH BILATERAL SCIATICA: Primary | ICD-10-CM

## 2024-01-30 PROCEDURE — 97112 NEUROMUSCULAR REEDUCATION: CPT | Mod: CQ

## 2024-01-30 PROCEDURE — 97530 THERAPEUTIC ACTIVITIES: CPT | Mod: CQ

## 2024-01-30 PROCEDURE — 97014 ELECTRIC STIMULATION THERAPY: CPT | Mod: CQ

## 2024-01-30 NOTE — PROGRESS NOTES
[facility]   Physical Therapy Treatment Note      Name: Devon Chapa  Clinic Number: [id number]    Therapy Diagnosis:   Encounter Diagnoses   Name Primary?    Chronic bilateral low back pain with bilateral sciatica Yes    Chronic right shoulder pain     Weakness of back     Right arm weakness      Physician: Trisha Montilla*    Visit Date: 1/30/2024    Therapy Diagnosis: Right Shoulder and Low Back Pain  Physician: Trisha Montilla*     Physician Orders: PT Eval and Treat   Medical Diagnosis from Referral: Right Shoulder and Low Back Pain  Evaluation Date: 1/3/2024  Authorization Period Expiration: 12/11/2024   Plan of Care Expiration: 3/1/2024   Progress Note Due: As needed   Visit # / Visits authorized: 4 / 15  PTA Visit #: 3/5      FOTO: 55/100     Precautions: Standard      Time In: 3:56 pm   Time Out: 4:42 pm   Total Appointment Time (timed & untimed codes): 46 billable minutes    Subjective     Patient reports: his lower back is hurting today.  He was compliant with home exercise program.  Response to previous treatment: a little complaints   Functional change: n/a    Pain: 3/10  Location: bilateral back      Objective      Objective Measures updated at progress report unless specified.     Treatment     Devon received the treatments listed below:      therapeutic exercises to develop strength, endurance, ROM, flexibility, posture, and core stabilization for 23 minutes including:.    Back Exercises    Bike/NuStep/elliptical Elliptical x 5 minutes                 Calf Stretch 3 x 20 second hold    Seated ball rollouts  5 x 5 second hold fwd/left/right   Seated piriformis stretch  2 x 20 second hold (B)   Hamstring Stretch 2 x 20 second hold (B)   SKTC (using towel) 2 x 20 second hold (B)       Cybex back extension  4 plates x 30    Cybex hamstring curls 5 plates x 30    Cybex hip extension with upper extremity propped on rail to isolate hip extensors only  2  plates x 20 (B)   Cybex bilateral leg press  8 plates 3 x 10       Supine hip flexor stretch hanging off side of table (modified Maik) 2 x 20 second hold each (B)       Bridging/Hooklying with Knee Ext    Trunk Rotation Exercise X 20   Trunk Rotation Stretch 5 x 15 second hold (B)   Ball - Trunk Rotation    Ball- Knee Extension    Planks    Quadraped           manual therapy techniques: - were applied to the: back/hips for 2 minutes, including:  Long Axis Traction (B)  Active HS       neuromuscular re-education activities to improve: Coordination, Kinesthetic, Sense, Proprioception, and Posture for 8 minutes. The following activities were included:  Bridging with Abduction X 15 (blue band)   Bridging/Hooklying with Marching X 20 with PPT (blue)   Bilateral extension with retraction X 20  blue band)     therapeutic activities to improve functional performance for -  minutes, including:    Patient received the following supervised modalities after being cleared for contradictions: Pre-Mod Electrical Stimulation:  Patient received Pre-Mod Electrical Stimulation for pain control applied to the lower lumbar region. Pt received stimulation at a frequency of  Hz for 15 minutes. Patient tolerated treatment well without any adverse effects.  hot pack for 15 minutes to lower lumbar region in conjunction with premod estim.    Patient Education and Home Exercises       Education provided:   - basic back home exercise program handout given today (1/16/2024). Issued blue Theraband to use with hooklying mat exercises. Patient was able to return demonstration of proper form and technique by return demonstration.     Written Home Exercises Provided: yes. Exercises were reviewed and Devon was able to demonstrate them prior to the end of the session.  Devon demonstrated good  understanding of the education provided. See Electronic Medical Record under Patient Instructions for exercises provided during therapy  sessions    Assessment       Pt arrived with continued reports of lumbar pain. He is doing well with therapy sessions so far but does have tightness in the left QL more than the right with decreased mobility noted during cybex hip extension today on the LLE.  Progressed mobility and strengthening exercises with fatigue noted from addition of cybex back extension machine and cybex bilateral leg press machine today. Pt tolerated session well with no adverse effects at end of session. Ended session with PREMOD and MHP per Patient request. Educated pt to keep up with HEP diligently. Will continue to progress as able when he returns.     PMH from evaluation:  Devon is a 52 y.o. male referred to outpatient Physical Therapy with a medical diagnosis of Right Shoulder and Low Back Pain. hristopher reports:  he has pain in the low back and bilateral hips with prolonged sitting. He reports he has stiffness in the back and core weakness. He states that the low back pain has worsened since he was last in therapy and he just feels tight and painful all the time.  He also still has pain in the Right shoulder. His main concern today is the low back pain and weakness. Patient reports he is usually very active and enjoys running and working in his yard. He has been unable to do this for some time now.  Patient will benefit from skilled Physical Therapy intervention to address all deficits and help patient to return to their prior level of function.     Devon Is progressing well towards his goals.   Patient prognosis is Good.     Patient will continue to benefit from skilled outpatient physical therapy to address the deficits listed in the problem list box on initial evaluation, provide pt/family education and to maximize pt's level of independence in the home and community environment.     Patient's spiritual, cultural and educational needs considered and pt agreeable to plan of care and goals.     Anticipated Barriers  for therapy: None    Goals:  Short Term Goals: 4 weeks   1. Patient will be Independent with Home Exercise Program   2. Patient will demonstrate with improved Posture and Body Mechanics  3. Patient will increase Lumbosacral Range of Motion by 25%   4. Patient will increase Lower Extremity and Core Strength to grossly 4+/5  5. Patient will increase Right Scapular strength from 4/5 to 5/5   6. Patient will decrease complaints of pain with activity to Less than or Equal to 5/10      Long Term Goals: 8 weeks   1. Patient will tolerate 30 minutes of activity with complaints of Pain at Less Than or Equal to 4/10    Plan   Plan of care Certification: 1/3/2024 to 3/1/2024.     Outpatient Physical Therapy 2 times weekly for 8 weeks to include the following interventions: Cervical/Lumbar Traction, Electrical Stimulation to decrease pain and inflammation as needed, Manual Therapy, Moist Heat/ Ice, Neuromuscular Re-ed, Patient Education, Therapeutic Activities, and Therapeutic Exercise.      Misha Mora, PTA    1/30/2024

## 2024-02-06 ENCOUNTER — CLINICAL SUPPORT (OUTPATIENT)
Dept: REHABILITATION | Facility: HOSPITAL | Age: 54
End: 2024-02-06
Payer: OTHER GOVERNMENT

## 2024-02-06 ENCOUNTER — TELEPHONE (OUTPATIENT)
Dept: GASTROENTEROLOGY | Facility: CLINIC | Age: 54
End: 2024-02-06
Payer: OTHER GOVERNMENT

## 2024-02-06 DIAGNOSIS — R29.898 WEAKNESS OF BACK: ICD-10-CM

## 2024-02-06 DIAGNOSIS — G89.29 CHRONIC RIGHT SHOULDER PAIN: ICD-10-CM

## 2024-02-06 DIAGNOSIS — G89.29 CHRONIC BILATERAL LOW BACK PAIN WITH BILATERAL SCIATICA: Primary | ICD-10-CM

## 2024-02-06 DIAGNOSIS — M25.511 CHRONIC RIGHT SHOULDER PAIN: ICD-10-CM

## 2024-02-06 DIAGNOSIS — R29.898 RIGHT ARM WEAKNESS: ICD-10-CM

## 2024-02-06 DIAGNOSIS — M54.42 CHRONIC BILATERAL LOW BACK PAIN WITH BILATERAL SCIATICA: Primary | ICD-10-CM

## 2024-02-06 DIAGNOSIS — M54.41 CHRONIC BILATERAL LOW BACK PAIN WITH BILATERAL SCIATICA: Primary | ICD-10-CM

## 2024-02-06 PROCEDURE — 97112 NEUROMUSCULAR REEDUCATION: CPT

## 2024-02-06 PROCEDURE — 97014 ELECTRIC STIMULATION THERAPY: CPT

## 2024-02-06 PROCEDURE — 97110 THERAPEUTIC EXERCISES: CPT

## 2024-02-06 NOTE — TELEPHONE ENCOUNTER
Returned call to patient Emerald Chapa's . Informed him that the Linzess was approved and she should be able to pick it up from her pharmacy. Verbalized understanding.          ----- Message from Wilma Newman MA sent at 2/6/2024 12:30 PM CST -----  Call from Emerald Chapa asking you to call her  [facility] at [phone]

## 2024-02-06 NOTE — PROGRESS NOTES
[facility]   Physical Therapy Treatment Note      Name: Devon Chapa  Clinic Number: [id number]    Therapy Diagnosis:   Encounter Diagnoses   Name Primary?    Chronic bilateral low back pain with bilateral sciatica Yes    Chronic right shoulder pain     Weakness of back     Right arm weakness      Physician: Trisha Montilla*    Visit Date: 2/6/2024    Therapy Diagnosis: Right Shoulder and Low Back Pain  Physician: Trisha Montilla*     Physician Orders: PT Eval and Treat   Medical Diagnosis from Referral: Right Shoulder and Low Back Pain  Evaluation Date: 1/3/2024  Authorization Period Expiration: 12/11/2024   Plan of Care Expiration: 3/1/2024   Progress Note Due: As needed   Visit # / Visits authorized: 5 / 15  PTA Visit #: 3/5      FOTO: 55/100     Precautions: Standard      Time In: 4:03 pm   Time Out: 4:58 pm   Total Appointment Time (timed & untimed codes): 55 billable minutes    Subjective     Patient reports: he is still having some low back pain.  He was compliant with home exercise program.  Response to previous treatment: a little complaints   Functional change: n/a    Pain: 3/10  Location: bilateral back      Objective      Objective Measures updated at progress report unless specified.     Treatment     Devon received the treatments listed below:      therapeutic exercises to develop strength, endurance, ROM, flexibility, posture, and core stabilization for 30 minutes including:.    Back Exercises    Bike/NuStep/elliptical Elliptical x 5 minutes                 Calf Stretch 3 x 20 second hold    Seated ball rollouts  5 x 5 second hold fwd/left/right   Seated piriformis stretch  2 x 20 second hold (B)   Hamstring Stretch 2 x 20 second hold (B)   SKTC (using towel) 2 x 20 second hold (B)       Cybex back extension  4 plates x 30    Cybex hamstring curls 5 plates x 30    Cybex hip extension with upper extremity propped on rail to isolate hip extensors  only  2 plates x 20 (B)   Cybex bilateral leg press  8 plates 3 x 10       Supine hip flexor stretch hanging off side of table (modified Maik) 2 x 20 second hold each (B)       Bridging/Hooklying with Knee Ext    Trunk Rotation Exercise X 20   Trunk Rotation Stretch 5 x 15 second hold (B)   Ball - Trunk Rotation    Ball- Knee Extension    Planks    Quadraped           manual therapy techniques: - were applied to the: back/hips for 2 minutes, including:  Long Axis Traction (B)  Active HS       neuromuscular re-education activities to improve: Coordination, Kinesthetic, Sense, Proprioception, and Posture for 10 minutes. The following activities were included:  Bridging with Abduction X 15 (blue band)   Bridging/Hooklying with Marching X 20 with PPT (blue)   Bilateral extension with retraction X 20  blue band)     therapeutic activities to improve functional performance for -  minutes, including:    Patient received the following supervised modalities after being cleared for contradictions: IFC Electrical Stimulation:  Patient received IFC Electrical Stimulation for pain control applied to the lower lumbar region. Pt received stimulation at a frequency of  Hz for 15 minutes. Patient tolerated treatment well without any adverse effects.  hot pack for 15 minutes to lower lumbar region in conjunction with premod estim.    Patient Education and Home Exercises       Education provided:   - basic back home exercise program handout given today (1/16/2024). Issued blue Theraband to use with hooklying mat exercises. Patient was able to return demonstration of proper form and technique by return demonstration.     Written Home Exercises Provided: yes. Exercises were reviewed and Devon was able to demonstrate them prior to the end of the session.  Devon demonstrated good  understanding of the education provided. See Electronic Medical Record under Patient Instructions for exercises provided during therapy  sessions    Assessment     Patient continues to have lumbar pain. He is actually moving very well in therapy and transitions well from machine to machine. Therapist is having him work out on the Cybex machines so we can add resistance. He is tolerating the Plan of Care well. We are working to increase strength and mobility in his Core and Lower Extremities. Ended sessio with IFC to Low Back and Mid Back area to address pain and inflammation. We will continue with the current Plan of Care. Sup Visit performed today with KODY Hernandez and KODY Cates.  All goals and treatment plan reviewed. Will work toward completion of all goals set.         PMH from evaluation:  Devon is a 52 y.o. male referred to outpatient Physical Therapy with a medical diagnosis of Right Shoulder and Low Back Pain. maxine reports:  he has pain in the low back and bilateral hips with prolonged sitting. He reports he has stiffness in the back and core weakness. He states that the low back pain has worsened since he was last in therapy and he just feels tight and painful all the time.  He also still has pain in the Right shoulder. His main concern today is the low back pain and weakness. Patient reports he is usually very active and enjoys running and working in his yard. He has been unable to do this for some time now.  Patient will benefit from skilled Physical Therapy intervention to address all deficits and help patient to return to their prior level of function.     Devon Is progressing well towards his goals.   Patient prognosis is Good.     Patient will continue to benefit from skilled outpatient physical therapy to address the deficits listed in the problem list box on initial evaluation, provide pt/family education and to maximize pt's level of independence in the home and community environment.     Patient's spiritual, cultural and educational needs considered and pt agreeable to plan of care and goals.      Anticipated Barriers for therapy: None    Goals:  Short Term Goals: 4 weeks   1. Patient will be Independent with Home Exercise Program   2. Patient will demonstrate with improved Posture and Body Mechanics  3. Patient will increase Lumbosacral Range of Motion by 25%   4. Patient will increase Lower Extremity and Core Strength to grossly 4+/5  5. Patient will increase Right Scapular strength from 4/5 to 5/5   6. Patient will decrease complaints of pain with activity to Less than or Equal to 5/10      Long Term Goals: 8 weeks   1. Patient will tolerate 30 minutes of activity with complaints of Pain at Less Than or Equal to 4/10    Plan   Plan of care Certification: 1/3/2024 to 3/1/2024.     Outpatient Physical Therapy 2 times weekly for 8 weeks to include the following interventions: Cervical/Lumbar Traction, Electrical Stimulation to decrease pain and inflammation as needed, Manual Therapy, Moist Heat/ Ice, Neuromuscular Re-ed, Patient Education, Therapeutic Activities, and Therapeutic Exercise.      WILFRIDO MONTAÑO, PT, DPT    2/6/2024

## 2024-04-17 PROBLEM — M25.511 CHRONIC RIGHT SHOULDER PAIN: Status: RESOLVED | Noted: 2024-01-03 | Resolved: 2024-04-17

## 2024-04-17 PROBLEM — M54.41 CHRONIC BILATERAL LOW BACK PAIN WITH BILATERAL SCIATICA: Status: RESOLVED | Noted: 2024-01-03 | Resolved: 2024-04-17

## 2024-04-17 PROBLEM — M54.42 CHRONIC BILATERAL LOW BACK PAIN WITH BILATERAL SCIATICA: Status: RESOLVED | Noted: 2024-01-03 | Resolved: 2024-04-17

## 2024-04-17 PROBLEM — R29.898 RIGHT ARM WEAKNESS: Status: RESOLVED | Noted: 2024-01-03 | Resolved: 2024-04-17

## 2024-04-17 PROBLEM — R29.898 WEAKNESS OF BACK: Status: RESOLVED | Noted: 2024-01-03 | Resolved: 2024-04-17

## 2024-04-17 PROBLEM — G89.29 CHRONIC BILATERAL LOW BACK PAIN WITH BILATERAL SCIATICA: Status: RESOLVED | Noted: 2024-01-03 | Resolved: 2024-04-17

## 2024-04-17 PROBLEM — G89.29 CHRONIC RIGHT SHOULDER PAIN: Status: RESOLVED | Noted: 2024-01-03 | Resolved: 2024-04-17

## 2024-04-17 NOTE — PLAN OF CARE
[facility]   Physical Therapy Discharge Summary     Name: Devon Chapa  Clinic Number: [id number]    Therapy Diagnosis:   Encounter Diagnoses   Name Primary?    Chronic bilateral low back pain with bilateral sciatica Yes    Chronic right shoulder pain     Weakness of back     Right arm weakness      Physician: Trisha Montilla*    Visit Date: 2/6/2024    Therapy Diagnosis: Right Shoulder and Low Back Pain  Physician: Trisha Montilla*     Physician Orders: PT Eval and Treat   Medical Diagnosis from Referral: Right Shoulder and Low Back Pain  Evaluation Date: 1/3/2024  Authorization Period Expiration: 12/11/2024   Plan of Care Expiration: 3/1/2024   Progress Note Due: As needed   Visit # / Visits authorized: 5 / 15  PTA Visit #: 3/5      FOTO: 55/100     Precautions: Standard      Time In: 4:03 pm   Time Out: 4:58 pm   Total Appointment Time (timed & untimed codes): 55 billable minutes    Subjective     Patient reports: he is still having some low back pain.  He was compliant with home exercise program.  Response to previous treatment: a little complaints   Functional change: n/a    Pain: 3/10  Location: bilateral back      Objective      Objective Measures updated at progress report unless specified.     Treatment     Devon received the treatments listed below:      therapeutic exercises to develop strength, endurance, ROM, flexibility, posture, and core stabilization for 30 minutes including:.    Back Exercises    Bike/NuStep/elliptical Elliptical x 5 minutes                 Calf Stretch 3 x 20 second hold    Seated ball rollouts  5 x 5 second hold fwd/left/right   Seated piriformis stretch  2 x 20 second hold (B)   Hamstring Stretch 2 x 20 second hold (B)   SKTC (using towel) 2 x 20 second hold (B)       Cybex back extension  4 plates x 30    Cybex hamstring curls 5 plates x 30    Cybex hip extension with upper extremity propped on rail to isolate hip extensors  only  2 plates x 20 (B)   Cybex bilateral leg press  8 plates 3 x 10       Supine hip flexor stretch hanging off side of table (modified Maik) 2 x 20 second hold each (B)       Bridging/Hooklying with Knee Ext    Trunk Rotation Exercise X 20   Trunk Rotation Stretch 5 x 15 second hold (B)   Ball - Trunk Rotation    Ball- Knee Extension    Planks    Quadraped           manual therapy techniques: - were applied to the: back/hips for 2 minutes, including:  Long Axis Traction (B)  Active HS       neuromuscular re-education activities to improve: Coordination, Kinesthetic, Sense, Proprioception, and Posture for 10 minutes. The following activities were included:  Bridging with Abduction X 15 (blue band)   Bridging/Hooklying with Marching X 20 with PPT (blue)   Bilateral extension with retraction X 20  blue band)     therapeutic activities to improve functional performance for -  minutes, including:    Patient received the following supervised modalities after being cleared for contradictions: IFC Electrical Stimulation:  Patient received IFC Electrical Stimulation for pain control applied to the lower lumbar region. Pt received stimulation at a frequency of  Hz for 15 minutes. Patient tolerated treatment well without any adverse effects.  hot pack for 15 minutes to lower lumbar region in conjunction with premod estim.    Patient Education and Home Exercises       Education provided:   - basic back home exercise program handout given today (1/16/2024). Issued blue Theraband to use with hooklying mat exercises. Patient was able to return demonstration of proper form and technique by return demonstration.     Written Home Exercises Provided: yes. Exercises were reviewed and Devon was able to demonstrate them prior to the end of the session.  Devon demonstrated good  understanding of the education provided. See Electronic Medical Record under Patient Instructions for exercises provided during therapy  sessions    Assessment     Patient continues to have lumbar pain. He is actually moving very well in therapy and transitions well from machine to machine. Therapist is having him work out on the Cybex machines so we can add resistance. He is tolerating the Plan of Care well. We are working to increase strength and mobility in his Core and Lower Extremities. Ended sessio with IFC to Low Back and Mid Back area to address pain and inflammation. We will continue with the current Plan of Care. Sup Visit performed today with KODY Hernandez and KODY Cates.  All goals and treatment plan reviewed. Will work toward completion of all goals set.         PMH from evaluation:  Devon is a 52 y.o. male referred to outpatient Physical Therapy with a medical diagnosis of Right Shoulder and Low Back Pain. maxine reports:  he has pain in the low back and bilateral hips with prolonged sitting. He reports he has stiffness in the back and core weakness. He states that the low back pain has worsened since he was last in therapy and he just feels tight and painful all the time.  He also still has pain in the Right shoulder. His main concern today is the low back pain and weakness. Patient reports he is usually very active and enjoys running and working in his yard. He has been unable to do this for some time now.  Patient will benefit from skilled Physical Therapy intervention to address all deficits and help patient to return to their prior level of function.     Devon Is progressing well towards his goals.   Patient prognosis is Good.     Patient will continue to benefit from skilled outpatient physical therapy to address the deficits listed in the problem list box on initial evaluation, provide pt/family education and to maximize pt's level of independence in the home and community environment.     Patient's spiritual, cultural and educational needs considered and pt agreeable to plan of care and goals.      Anticipated Barriers for therapy: None    Goals:  Short Term Goals: 4 weeks   1. Patient will be Independent with Home Exercise Program   2. Patient will demonstrate with improved Posture and Body Mechanics  3. Patient will increase Lumbosacral Range of Motion by 25%   4. Patient will increase Lower Extremity and Core Strength to grossly 4+/5  5. Patient will increase Right Scapular strength from 4/5 to 5/5   6. Patient will decrease complaints of pain with activity to Less than or Equal to 5/10      Long Term Goals: 8 weeks   1. Patient will tolerate 30 minutes of activity with complaints of Pain at Less Than or Equal to 4/10    Discharge reason: Patient did not return to Therapy following this treatment on 2/6/2024. Therefore patient is discharged from Physical Therapy services at current level of function as listed above.     Plan   This patient is discharged from Physical Therapy.    Date of Last visit: 2/6/2024  Total Visits Received: 5  Cancelled Visits: 1  No Show Visits: 3    WILFRIDO MONTAÑO, PT, DPT

## 2024-10-02 DIAGNOSIS — M54.50 LOW BACK PAIN: Primary | ICD-10-CM

## 2024-10-25 DIAGNOSIS — M19.91 PRIMARY OSTEOARTHRITIS, UNSPECIFIED SITE: Primary | ICD-10-CM

## 2024-11-21 ENCOUNTER — CLINICAL SUPPORT (OUTPATIENT)
Dept: REHABILITATION | Facility: HOSPITAL | Age: 54
End: 2024-11-21
Payer: OTHER GOVERNMENT

## 2024-11-21 DIAGNOSIS — M19.91 PRIMARY OSTEOARTHRITIS, UNSPECIFIED SITE: Primary | ICD-10-CM

## 2024-11-21 PROCEDURE — 97161 PT EVAL LOW COMPLEX 20 MIN: CPT

## 2024-11-21 NOTE — PLAN OF CARE
[facility]  Physical Therapy Initial Evaluation    Date: 11/21/2024   Name: Devon Chapa  Clinic Number: [id number]    Therapy Diagnosis:   Encounter Diagnosis   Name Primary?    Primary osteoarthritis, unspecified site Yes     Physician: Trisha Montilla*    Physician Orders: PT Eval and Treat   Medical Diagnosis from Referral: LBP  Evaluation Date: 11/21/2024  Authorization Period Expiration: 2/25/25  Plan of Care Expiration: 2/13/25  Visit # / Visits authorized: 1/ 15    Time In: 126  Time Out: 145  Total Appointment Time (timed & untimed codes): 14 minutes    Precautions: Standard and Diabetes    Subjective   Date of onset: Chronic     History of current condition - Devon reports: Had epidural two weeks ago and did get some relief. Currently the right side of the low back is a throbbing/dull ache constantly. Worse with prolonged standing/walking and lifting. Relief with flexeril, medication, chiropractor with heat/TENS and adjustments (1x weekly) and heat. Denies numbness/tingling. Feels stable with no falls. In the past, did get relief with aquatic, heat and chiropractor and did have physical therapy in the past with a little relief.  Able to sleep through the night.      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    Surgical History:   Devon Chapa  has a past surgical history that includes Splenectomy, total.    Medications:   Devon has a current medication list which includes the following prescription(s): cyclobenzaprine, omeprazole, and sertraline.    Allergies:   Review of patient's allergies indicates:  No Known Allergies       Imaging, bone scan films: XR LUMBAR SPINE AP AND LATERAL  CLINICAL HISTORY:  Pain, unspecified  COMPARISON:  None  TECHNIQUE:  Frontal and lateral views of the lumbar spine.  FINDINGS:  Lumbar vertebral body heights and alignment appear maintained.  Mild  scattered posterior facet arthropathy.  Mild loss of disc space height noted at several levels with minimal marginal vertebral body osteophyte formation.  Lumbar vertebral body heights and alignment appear maintained.  Impression:  Mild degenerative change of the lumbar spine.       Pain:  Current 3/10, worst 8/10,  Location: bilateral back    Description: Aching, Dull, and Throbbing  Aggravating Factors: Sitting, Standing, Bending, and Lifting  Easing Factors: pain medication, heating pad, and rest        Objective     Able to single leg balance     Posture:  Level pelvis  No leg length difference    Manual muscle test:  Bilateral hip flexion/hamstring/quad 5/5  Bilateral glute max 4-/5  Bilateral glute med 5/5    Palpation:  (+) right greater trochanter     Sensation:  Bilateral light touch intact for lower extremities dermatome    Special Tests:   (-) bilateral sitting slump   (-) bilateral straight leg raise   (-) bilateral RAMON  No quad lag      Limitation/Restriction for FOTO Survey    Therapist reviewed FOTO scores for Devon Chapa on 11/21/2024.   FOTO documents entered into [employer] - see Media section.    Intake Score: 57%         Home Exercises and Patient Education Provided    Education provided:   Eval Findings  Patient educated on biomechanical justification for therapeutic exercise and importance of compliance with HEP in order to improve overall impairments and QOL   Patient was educated on all the above exercise prior/during/after for proper posture, positioning, and execution for safe performance with home exercise program if exercises were given during evaluation.  Patient educated on the importance of improved core and upper and lower extremity strength in order to improve alignment of the spine and upper and lower extremities with static positions and dynamic movement.   Patient educated on the importance of strong core and lower extremity musculature in order to improve both static and  dynamic balance, improve gait mechanics, reduce fall risk and improve household and community mobility.     Written Home Exercises Provided:  - . Evaluation findings discussed.  Exercises were reviewed and Devon was able to demonstrate them prior to the end of the session.  Devon demonstrated good  understanding of the education provided.       Assessment   Devon is a 54 y.o. male referred to outpatient Physical Therapy with a medical diagnosis of low back pain. Patient presents with recurrent back pain that has been occurring since his  days in 2007. Patient has multiple visits to physical therapy in the past as well as chiropractor and other services. Patient has good lower extremities strength but is weak with core stabilization and posterior chain musculature. Patient is very tight with bilateral lower extremities and lumbar musculature. Negative for discogenic symptoms but imaging does show arthritic changes in the lumbar spine. Patient will benefit from skilled physical therapy at this time to address deficits with core/hip stabilization, stretching, myofascial release/massage and traction as needed.     Case Conference with Shonda Redd, JOSE    Patient prognosis is Good.     Patient will benefit from skilled outpatient Physical Therapy to address the deficits stated above and in the chart below, provide patient /family education, and to maximize patientt's level of independence.     Plan of care discussed with patient: Yes  Patient's spiritual, cultural and educational needs considered and patient is agreeable to the plan of care and goals as stated below:     Anticipated Barriers for therapy: chronic condition      Medical Necessity is demonstrated by the following  History  Co-morbidities and personal factors that may impact the plan of care [x] LOW: no personal factors / co-morbidities  [] MODERATE: 1-2 personal factors / co-morbidities  [] HIGH: 3+ personal factors /  co-morbidities    Moderate / High Support Documentation:   Co-morbidities affecting plan of care: -    Personal Factors:   lifestyle     Examination  Body Structures and Functions, activity limitations and participation restrictions that may impact the plan of care [x] LOW: addressing 1-2 elements  [] MODERATE: 3+ elements  [] HIGH: 4+ elements (please support below)    Moderate / High Support Documentation: -     Clinical Presentation [x] LOW: stable  [] MODERATE: Evolving  [] HIGH: Unstable     Decision Making/ Complexity Score: low         Goals:  Short Term Goals: 6 weeks   1. Patient will have 4+/5 for bilateral glute max manual muscle test for stability   Patient will maintain prolonged positions x 15 minutes with 3 /10 pain  Patient will report a change in pain symptoms from constant to intermittent     Long Term Goals: 12 weeks   Patient will improve manual muscle test to 5/5 for bilateral glute max and all posterior chain   Patient will maintain prolonged positions x 30 minutes with  1 /10 pain  Patient will be independent with home exercise program by D/C    Plan   Plan of care Certification: 11/21/2024 to 2/13/25.    Outpatient Physical Therapy 2 times weekly for 12 weeks to include the following interventions:  45447 [PT re-evaluation], 37099 [therapeutic exercise], 56467 [neuromuscular re-education], 21249 [manual therapy], 24933 [therapeutic activities], 46505 [aquatic therapy], 19565 [ultrasound], 33635 [unattended electrical stimulation], 43675 [mechanical traction], and 40633 [vasopneumatic device]    KATEY PALACIOS, PT        I CERTIFY THE NEED FOR THESE SERVICES FURNISHED UNDER THIS PLAN OF TREATMENT AND WHILE UNDER MY CARE.    Physician's comments:      Physician's Signature: ____________________________________________Date________________        Please fax this MD signed form to:  [facility]  [phone] fax

## 2024-11-27 ENCOUNTER — CLINICAL SUPPORT (OUTPATIENT)
Dept: REHABILITATION | Facility: HOSPITAL | Age: 54
End: 2024-11-27
Payer: OTHER GOVERNMENT

## 2024-11-27 DIAGNOSIS — M54.41 CHRONIC BILATERAL LOW BACK PAIN WITH BILATERAL SCIATICA: Primary | ICD-10-CM

## 2024-11-27 DIAGNOSIS — M54.42 CHRONIC BILATERAL LOW BACK PAIN WITH BILATERAL SCIATICA: Primary | ICD-10-CM

## 2024-11-27 DIAGNOSIS — G89.29 CHRONIC BILATERAL LOW BACK PAIN WITH BILATERAL SCIATICA: Primary | ICD-10-CM

## 2024-11-27 PROCEDURE — 97014 ELECTRIC STIMULATION THERAPY: CPT | Mod: CQ

## 2024-11-27 PROCEDURE — 97112 NEUROMUSCULAR REEDUCATION: CPT | Mod: CQ

## 2024-11-27 PROCEDURE — 97110 THERAPEUTIC EXERCISES: CPT | Mod: CQ

## 2024-11-27 NOTE — PROGRESS NOTES
[facility]   Physical Therapy Treatment Note      Name: Devon Chapa  Clinic Number: [id number]    Therapy Diagnosis: No diagnosis found.  Physician: Trisha Montilla*    Visit Date: 11/27/2024    Physician Orders: PT Eval and Treat   Medical Diagnosis from Referral: LBP  Evaluation Date: 11/21/2024  Authorization Period Expiration: 2/25/25  Plan of Care Expiration: 2/13/25  Visit # / Visits authorized: 2/ 15     Time In: 1:08 pm   Time Out: 2:01 pm   Total Appointment Time (timed & untimed codes): 53 minutes     Precautions: Standard and Diabetes       PTA Visit #: 1/5       Subjective     Patient reports: I am stiff today.  He  N/A  compliant with home exercise program.  Response to previous treatment: first treatment after evaluation   Functional change: none     Pain: 3/10  Location: bilateral back      Objective      Objective Measures updated at progress report unless specified.     Treatment     Devon received the treatments listed below:      therapeutic exercises to develop strength, endurance, posture, and core stabilization for 15 minutes including:  Bike 5 minutes  Slant board 3 x 30 second hold   Hamstring stretch 3 x 30 second hold   ball roll outs 5 x 5 second hold   Piriformis stretch 4 x 15 second hold     manual therapy techniques: Joint mobilizations, Manual traction, Soft tissue Mobilization, and Friction Massage were applied to the: - for - minutes, including:  -    neuromuscular re-education activities to improve: Balance, Coordination, Proprioception, and Posture for 23 minutes. The following activities were included:  Cybex back extension 3 x 10 4#  Cybex abs 3 x 10 3#  Cybex hip extension 3 x 10 3#  Cybex hip flexion ----  Cybex hip abduction 3 x 10 3#      supervised modalities after being cleared for contradictions: IFC Electrical Stimulation:  Devon received IFC Electrical Stimulation for pain control applied to the lower back. Pt  received stimulation at 100 % scan for 15 minutes. Devon tolderated treatment well without any adverse effects.         supervised modalities after being cleared for contradictions: Mechanical Traction:  Devon received intermittent mechanical traction to the lumbar spine at a force of - pounds for a total of - minutes. Hold time of - minutes and rest time for -  Minutes. Patient tolerated treatment well without any adverse effects.    Patient Education and Home Exercises       Education provided:   - home exercise program will be given     Written Home Exercises Provided:  N/a . Exercises were reviewed and Devon was able to demonstrate them prior to the end of the session.  Devon demonstrated  N/A  understanding of the education provided. See Electronic Medical Record under Patient Instructions for exercises provided during therapy sessions    Assessment     Case conference with Josefa Vasquez DPT. Patient was informed on correct body mechanics to perform exercises correctly. He did well today but did fatigue and took rest breaks as needed. He requested to end with heat and IFC today.    Devon Is progressing well towards his goals.   Patient prognosis is Good.     Patient will continue to benefit from skilled outpatient physical therapy to address the deficits listed in the problem list box on initial evaluation, provide pt/family education and to maximize pt's level of independence in the home and community environment.     Patient's spiritual, cultural and educational needs considered and pt agreeable to plan of care and goals.     Anticipated barriers to physical therapy: none    Goals:   Short Term Goals: 6 weeks   1. Patient will have 4+/5 for bilateral glute max manual muscle test for stability   Patient will maintain prolonged positions x 15 minutes with 3 /10 pain  Patient will report a change in pain symptoms from constant to intermittent      Long Term Goals: 12 weeks    Patient will improve manual muscle test to 5/5 for bilateral glute max and all posterior chain   Patient will maintain prolonged positions x 30 minutes with  1 /10 pain  Patient will be independent with home exercise program by D/C       Plan     Plan of care Certification: 11/21/2024 to 2/13/25.     Outpatient Physical Therapy 2 times weekly for 12 weeks to include the following interventions:  32331 [PT re-evaluation], 86132 [therapeutic exercise], 77322 [neuromuscular re-education], 46605 [manual therapy], 36161 [therapeutic activities], 06131 [aquatic therapy], 82483 [ultrasound], 90466 [unattended electrical stimulation], 89472 [mechanical traction], and 39559 [vasopneumatic device]       Shonda Redd PTA

## 2024-12-03 ENCOUNTER — CLINICAL SUPPORT (OUTPATIENT)
Dept: REHABILITATION | Facility: HOSPITAL | Age: 54
End: 2024-12-03
Payer: OTHER GOVERNMENT

## 2024-12-03 DIAGNOSIS — M54.41 CHRONIC BILATERAL LOW BACK PAIN WITH BILATERAL SCIATICA: Primary | ICD-10-CM

## 2024-12-03 DIAGNOSIS — M54.42 CHRONIC BILATERAL LOW BACK PAIN WITH BILATERAL SCIATICA: Primary | ICD-10-CM

## 2024-12-03 DIAGNOSIS — G89.29 CHRONIC BILATERAL LOW BACK PAIN WITH BILATERAL SCIATICA: Primary | ICD-10-CM

## 2024-12-03 PROCEDURE — 97112 NEUROMUSCULAR REEDUCATION: CPT | Mod: CQ

## 2024-12-03 PROCEDURE — 97110 THERAPEUTIC EXERCISES: CPT | Mod: CQ

## 2024-12-03 PROCEDURE — 97012 MECHANICAL TRACTION THERAPY: CPT | Mod: CQ

## 2024-12-03 NOTE — PROGRESS NOTES
[facility]   Physical Therapy Treatment Note      Name: Devon Chapa  Clinic Number: [id number]    Therapy Diagnosis: No diagnosis found.  Physician: Trisha Montilla*    Visit Date: 12/3/2024    Physician Orders: PT Eval and Treat   Medical Diagnosis from Referral: LBP  Evaluation Date: 11/21/2024  Authorization Period Expiration: 2/25/25  Plan of Care Expiration: 2/13/25  Visit # / Visits authorized: 3/ 15     Time In: 2:35 pm   Time Out: 3:30 pm   Total Appointment Time (timed & untimed codes):  55 minutes     Precautions: Standard and Diabetes       PTA Visit #: 2/5       Subjective     Patient reports: he is a little sore.  He  N/A  compliant with home exercise program.  Response to previous treatment: first treatment after evaluation   Functional change: none     Pain: 1/10  Location: bilateral back      Objective      Objective Measures updated at progress report unless specified.     Treatment     Devon received the treatments listed below:      therapeutic exercises to develop strength, endurance, posture, and core stabilization for 15 minutes including:  Bike 5 minutes  Slant board 3 x 30 second hold   Hamstring stretch 3 x 30 second hold   ball roll outs 5 x 5 second hold   Piriformis stretch 4 x 15 second hold     manual therapy techniques: Joint mobilizations, Manual traction, Soft tissue Mobilization, and Friction Massage were applied to the: - for - minutes, including:  -    neuromuscular re-education activities to improve: Balance, Coordination, Proprioception, and Posture for 25 minutes. The following activities were included:  Cybex back extension 3 x 10 4#  Cybex abs 3 x 10 3#  Cybex hip extension 3 x 10 3#  Cybex hip flexion 3 x 10 3#  Cybex hip abduction 3 x 10 3#       supervised modalities after being cleared for contradictions: Mechanical Traction:  Devon received intermittent mechanical traction to the lumbar spine at a force of 90  pounds for a total of 15 minutes. Hold time of 45 seconds and rest time for 15 seconds. Patient tolerated treatment well without any adverse effects.      supervised modalities after being cleared for contradictions: IFC Electrical Stimulation:  Devon received IFC Electrical Stimulation for pain control applied to the lower back. Pt received stimulation at 100 % scan for 0 minutes. Devon tolderated treatment well without any adverse effects.      Patient Education and Home Exercises       Education provided:   - home exercise program will be given     Written Home Exercises Provided:  N/a . Exercises were reviewed and Devon was able to demonstrate them prior to the end of the session.  Devon demonstrated  N/A  understanding of the education provided. See Electronic Medical Record under Patient Instructions for exercises provided during therapy sessions    Assessment     Patient was informed on correct body mechanics to perform exercises correctly. He did well today but did fatigue and took rest breaks as needed. Ended session with lumbar traction for radicular symptoms.     Devon Is progressing well towards his goals.   Patient prognosis is Good.     Patient will continue to benefit from skilled outpatient physical therapy to address the deficits listed in the problem list box on initial evaluation, provide pt/family education and to maximize pt's level of independence in the home and community environment.     Patient's spiritual, cultural and educational needs considered and pt agreeable to plan of care and goals.     Anticipated barriers to physical therapy: none    Goals:   Short Term Goals: 6 weeks   1. Patient will have 4+/5 for bilateral glute max manual muscle test for stability   Patient will maintain prolonged positions x 15 minutes with 3 /10 pain  Patient will report a change in pain symptoms from constant to intermittent      Long Term Goals: 12 weeks   Patient will improve  manual muscle test to 5/5 for bilateral glute max and all posterior chain   Patient will maintain prolonged positions x 30 minutes with  1 /10 pain  Patient will be independent with home exercise program by D/C       Plan     Plan of care Certification: 11/21/2024 to 2/13/25.     Outpatient Physical Therapy 2 times weekly for 12 weeks to include the following interventions:  90484 [PT re-evaluation], 23835 [therapeutic exercise], 51462 [neuromuscular re-education], 31634 [manual therapy], 74717 [therapeutic activities], 32766 [aquatic therapy], 29719 [ultrasound], 14926 [unattended electrical stimulation], 64740 [mechanical traction], and 23675 [vasopneumatic device]       Shonda Redd PTA

## 2024-12-05 ENCOUNTER — CLINICAL SUPPORT (OUTPATIENT)
Dept: REHABILITATION | Facility: HOSPITAL | Age: 54
End: 2024-12-05
Payer: OTHER GOVERNMENT

## 2024-12-05 DIAGNOSIS — G89.29 CHRONIC BILATERAL LOW BACK PAIN WITH BILATERAL SCIATICA: Primary | ICD-10-CM

## 2024-12-05 DIAGNOSIS — M54.42 CHRONIC BILATERAL LOW BACK PAIN WITH BILATERAL SCIATICA: Primary | ICD-10-CM

## 2024-12-05 DIAGNOSIS — M54.41 CHRONIC BILATERAL LOW BACK PAIN WITH BILATERAL SCIATICA: Primary | ICD-10-CM

## 2024-12-05 PROCEDURE — 97112 NEUROMUSCULAR REEDUCATION: CPT | Mod: CQ

## 2024-12-05 PROCEDURE — 97012 MECHANICAL TRACTION THERAPY: CPT | Mod: CQ

## 2024-12-05 PROCEDURE — 97110 THERAPEUTIC EXERCISES: CPT | Mod: CQ

## 2024-12-05 NOTE — PROGRESS NOTES
[facility]   Physical Therapy Treatment Note      Name: Devon Chapa  Clinic Number: [id number]    Therapy Diagnosis: No diagnosis found.  Physician: Trisha Montilla*    Visit Date: 12/5/2024    Physician Orders: PT Eval and Treat   Medical Diagnosis from Referral: LBP  Evaluation Date: 11/21/2024  Authorization Period Expiration: 2/25/25  Plan of Care Expiration: 2/13/25  Visit # / Visits authorized: 3/ 15     Time In: 3:00 pm   Time Out: 3:53 pm   Total Appointment Time (timed & untimed codes):  53 minutes     Precautions: Standard and Diabetes       PTA Visit #: 2/5       Subjective     Patient reports: That traction really helped.  He  N/A  compliant with home exercise program.  Response to previous treatment: first treatment after evaluation   Functional change: none     Pain: 3/10  Location: bilateral back      Objective      Objective Measures updated at progress report unless specified.     Treatment     Devon received the treatments listed below:      therapeutic exercises to develop strength, endurance, posture, and core stabilization for 15 minutes including:  Bike 5 minutes  Slant board 3 x 30 second hold   Hamstring stretch 3 x 30 second hold   ball roll outs 5 x 5 second hold   Piriformis stretch 4 x 15 second hold     manual therapy techniques: Joint mobilizations, Manual traction, Soft tissue Mobilization, and Friction Massage were applied to the: - for - minutes, including:  -    neuromuscular re-education activities to improve: Balance, Coordination, Proprioception, and Posture for 23 minutes. The following activities were included:  Cybex back extension 3 x 10 4#  Cybex abs 3 x 10 3#  Cybex hip extension 3 x 10 3#  Cybex hip flexion 3 x 10 3#  Cybex hip abduction 3 x 10 3#       supervised modalities after being cleared for contradictions: Mechanical Traction:  Devon received intermittent mechanical traction to the lumbar spine at a force of  90 pounds for a total of 15 minutes. Hold time of 45 seconds and rest time for 15 seconds. Patient tolerated treatment well without any adverse effects.      supervised modalities after being cleared for contradictions: IFC Electrical Stimulation:  Devon received IFC Electrical Stimulation for pain control applied to the lower back. Pt received stimulation at 100 % scan for 0 minutes. Devon tolderated treatment well without any adverse effects.      Patient Education and Home Exercises       Education provided:   - home exercise program will be given     Written Home Exercises Provided:  N/a . Exercises were reviewed and Devon was able to demonstrate them prior to the end of the session.  Devon demonstrated  N/A  understanding of the education provided. See Electronic Medical Record under Patient Instructions for exercises provided during therapy sessions    Assessment     Patient was informed on correct body mechanics to perform exercises correctly. He did well today but did fatigue and took rest breaks as needed. Ended session with lumbar traction for radicular symptoms. He states that he feels great relief after traction.    Devon Is progressing well towards his goals.   Patient prognosis is Good.     Patient will continue to benefit from skilled outpatient physical therapy to address the deficits listed in the problem list box on initial evaluation, provide pt/family education and to maximize pt's level of independence in the home and community environment.     Patient's spiritual, cultural and educational needs considered and pt agreeable to plan of care and goals.     Anticipated barriers to physical therapy: none    Goals:   Short Term Goals: 6 weeks   1. Patient will have 4+/5 for bilateral glute max manual muscle test for stability   Patient will maintain prolonged positions x 15 minutes with 3 /10 pain  Patient will report a change in pain symptoms from constant to  intermittent      Long Term Goals: 12 weeks   Patient will improve manual muscle test to 5/5 for bilateral glute max and all posterior chain   Patient will maintain prolonged positions x 30 minutes with  1 /10 pain  Patient will be independent with home exercise program by D/C       Plan     Plan of care Certification: 11/21/2024 to 2/13/25.     Outpatient Physical Therapy 2 times weekly for 12 weeks to include the following interventions:  36179 [PT re-evaluation], 97306 [therapeutic exercise], 80724 [neuromuscular re-education], 00680 [manual therapy], 55320 [therapeutic activities], 35634 [aquatic therapy], 59937 [ultrasound], 04847 [unattended electrical stimulation], 31413 [mechanical traction], and 12443 [vasopneumatic device]       Shonda Redd PTA

## 2024-12-23 ENCOUNTER — CLINICAL SUPPORT (OUTPATIENT)
Dept: REHABILITATION | Facility: HOSPITAL | Age: 54
End: 2024-12-23
Payer: OTHER GOVERNMENT

## 2024-12-23 DIAGNOSIS — M54.41 CHRONIC BILATERAL LOW BACK PAIN WITH BILATERAL SCIATICA: Primary | ICD-10-CM

## 2024-12-23 DIAGNOSIS — M54.42 CHRONIC BILATERAL LOW BACK PAIN WITH BILATERAL SCIATICA: Primary | ICD-10-CM

## 2024-12-23 DIAGNOSIS — G89.29 CHRONIC BILATERAL LOW BACK PAIN WITH BILATERAL SCIATICA: Primary | ICD-10-CM

## 2024-12-23 PROCEDURE — 97012 MECHANICAL TRACTION THERAPY: CPT

## 2024-12-23 PROCEDURE — 97110 THERAPEUTIC EXERCISES: CPT

## 2024-12-23 PROCEDURE — 97112 NEUROMUSCULAR REEDUCATION: CPT

## 2024-12-23 NOTE — PROGRESS NOTES
[facility]   Physical Therapy Treatment Note      Name: Devon Chapa  Clinic Number: [id number]    Therapy Diagnosis: No diagnosis found.  Physician: Trisha Montilla*    Visit Date: 12/23/2024    Physician Orders: PT Eval and Treat   Medical Diagnosis from Referral: LBP  Evaluation Date: 11/21/2024  Authorization Period Expiration: 2/25/25  Plan of Care Expiration: 2/13/25  Visit # / Visits authorized: 4/ 15     Time In: 1:50 pm   Time Out: 2:43 pm   Total Appointment Time (timed & untimed codes):  53 minutes     Precautions: Standard and Diabetes       PTA Visit #: 2/5       Subjective     Patient reports: wants to do traction again today.   He  N/A  compliant with home exercise program.  Response to previous treatment: first treatment after evaluation   Functional change: none     Pain: 3/10  Location: bilateral back      Objective      Objective Measures updated at progress report unless specified.     Treatment     Devon received the treatments listed below:      therapeutic exercises to develop strength, endurance, posture, and core stabilization for 15 minutes including:  Bike 5 minutes  Slant board 3 x 30 second hold   Hamstring stretch 3 x 30 second hold   ball roll outs 5 x 5 second hold   Piriformis stretch 4 x 15 second hold     manual therapy techniques: Joint mobilizations, Manual traction, Soft tissue Mobilization, and Friction Massage were applied to the: - for - minutes, including:  -    neuromuscular re-education activities to improve: Balance, Coordination, Proprioception, and Posture for 23 minutes. The following activities were included:  Cybex back extension 3 x 10 4#  Cybex abs 3 x 10 3#  Cybex hip extension 3 x 10 3#  Cybex hip flexion 3 x 10 3#  Cybex hip abduction 3 x 10 3#   Cybex Leg Press Bilateral 3 x 10 with 7#      supervised modalities after being cleared for contradictions: Mechanical Traction:  Devon received intermittent  mechanical traction to the lumbar spine at a force of 100 pounds for a total of 15 minutes. Hold time of 45 seconds and rest time for 15 seconds. Patient tolerated treatment well without any adverse effects.      supervised modalities after being cleared for contradictions: IFC Electrical Stimulation:  Devon received IFC Electrical Stimulation for pain control applied to the lower back. Pt received stimulation at 100 % scan for 0 minutes. Devon tolderated treatment well without any adverse effects.      Patient Education and Home Exercises       Education provided:   - home exercise program will be given     Written Home Exercises Provided:  N/a . Exercises were reviewed and Devon was able to demonstrate them prior to the end of the session.  Devon demonstrated  N/A  understanding of the education provided. See Electronic Medical Record under Patient Instructions for exercises provided during therapy sessions    Assessment     Patient reports that he thinks the traction is really helping. Therapist had patient perform Core and Lower Extremity strengthening exercises as listed above. Therapist added Cybex Leg Press today and he did well with this. He did require rest breaks on occasion during today's session. Ended session with mechanical lumbar traction. Therapist increased weight of pull on traction from 90 lbs to 100 lbs. He tolerated this very well. We plan to continue to progress him as tolerated. Sup Visit performed today with KODY Rolon.  All goals and treatment plan reviewed. Will work toward completion of all new goals set.         Devon Is progressing well towards his goals.   Patient prognosis is Good.     Patient will continue to benefit from skilled outpatient physical therapy to address the deficits listed in the problem list box on initial evaluation, provide pt/family education and to maximize pt's level of independence in the home and community environment.      Patient's spiritual, cultural and educational needs considered and pt agreeable to plan of care and goals.     Anticipated barriers to physical therapy: none    Goals:   Short Term Goals: 6 weeks   1. Patient will have 4+/5 for bilateral glute max manual muscle test for stability   Patient will maintain prolonged positions x 15 minutes with 3 /10 pain  Patient will report a change in pain symptoms from constant to intermittent      Long Term Goals: 12 weeks   Patient will improve manual muscle test to 5/5 for bilateral glute max and all posterior chain   Patient will maintain prolonged positions x 30 minutes with  1 /10 pain  Patient will be independent with home exercise program by D/C       Plan     Plan of care Certification: 11/21/2024 to 2/13/25.     Outpatient Physical Therapy 2 times weekly for 12 weeks to include the following interventions:  93575 [PT re-evaluation], 71958 [therapeutic exercise], 59266 [neuromuscular re-education], 28493 [manual therapy], 13674 [therapeutic activities], 88396 [aquatic therapy], 59841 [ultrasound], 47677 [unattended electrical stimulation], 21871 [mechanical traction], and 88739 [vasopneumatic device]       WILFRIDO MONTAÑO, PT, DPT

## 2025-04-08 DIAGNOSIS — M54.50 LOWER BACK PAIN: Primary | ICD-10-CM
